# Patient Record
Sex: FEMALE | Employment: UNEMPLOYED | URBAN - METROPOLITAN AREA
[De-identification: names, ages, dates, MRNs, and addresses within clinical notes are randomized per-mention and may not be internally consistent; named-entity substitution may affect disease eponyms.]

---

## 2023-11-08 ENCOUNTER — HOSPITAL ENCOUNTER (EMERGENCY)
Facility: HOSPITAL | Age: 23
Discharge: HOME | End: 2023-11-08
Attending: EMERGENCY MEDICINE
Payer: MEDICAID

## 2023-11-08 ENCOUNTER — APPOINTMENT (OUTPATIENT)
Dept: RADIOLOGY | Facility: HOSPITAL | Age: 23
End: 2023-11-08
Payer: MEDICAID

## 2023-11-08 VITALS
TEMPERATURE: 97.9 F | SYSTOLIC BLOOD PRESSURE: 136 MMHG | WEIGHT: 243.61 LBS | RESPIRATION RATE: 18 BRPM | HEART RATE: 88 BPM | DIASTOLIC BLOOD PRESSURE: 80 MMHG | OXYGEN SATURATION: 98 % | HEIGHT: 62 IN | BODY MASS INDEX: 44.83 KG/M2

## 2023-11-08 DIAGNOSIS — T14.90XA BLUNT TRAUMA: Primary | ICD-10-CM

## 2023-11-08 DIAGNOSIS — Z34.90 PREGNANCY, UNSPECIFIED GESTATIONAL AGE (HHS-HCC): ICD-10-CM

## 2023-11-08 LAB
ALBUMIN SERPL BCP-MCNC: 4.1 G/DL (ref 3.4–5)
ALP SERPL-CCNC: 77 U/L (ref 33–110)
ALT SERPL W P-5'-P-CCNC: 11 U/L (ref 7–45)
ANION GAP SERPL CALC-SCNC: 13 MMOL/L (ref 10–20)
APPEARANCE UR: ABNORMAL
AST SERPL W P-5'-P-CCNC: 13 U/L (ref 9–39)
B-HCG SERPL-ACNC: NORMAL MIU/ML
BACTERIA #/AREA URNS AUTO: ABNORMAL /HPF
BASOPHILS # BLD AUTO: 0.01 X10*3/UL (ref 0–0.1)
BASOPHILS NFR BLD AUTO: 0.1 %
BILIRUB SERPL-MCNC: 0.2 MG/DL (ref 0–1.2)
BILIRUB UR STRIP.AUTO-MCNC: NEGATIVE MG/DL
BUN SERPL-MCNC: 8 MG/DL (ref 6–23)
CALCIUM SERPL-MCNC: 9.8 MG/DL (ref 8.6–10.3)
CHLORIDE SERPL-SCNC: 103 MMOL/L (ref 98–107)
CO2 SERPL-SCNC: 23 MMOL/L (ref 21–32)
COLOR UR: YELLOW
CREAT SERPL-MCNC: 0.59 MG/DL (ref 0.5–1.05)
EOSINOPHIL # BLD AUTO: 0.04 X10*3/UL (ref 0–0.7)
EOSINOPHIL NFR BLD AUTO: 0.4 %
ERYTHROCYTE [DISTWIDTH] IN BLOOD BY AUTOMATED COUNT: 14.4 % (ref 11.5–14.5)
GFR SERPL CREATININE-BSD FRML MDRD: >90 ML/MIN/1.73M*2
GLUCOSE SERPL-MCNC: 79 MG/DL (ref 74–99)
GLUCOSE UR STRIP.AUTO-MCNC: NEGATIVE MG/DL
HCT VFR BLD AUTO: 37.2 % (ref 36–46)
HGB BLD-MCNC: 11.9 G/DL (ref 12–16)
HOLD SPECIMEN: NORMAL
IMM GRANULOCYTES # BLD AUTO: 0.02 X10*3/UL (ref 0–0.7)
IMM GRANULOCYTES NFR BLD AUTO: 0.2 % (ref 0–0.9)
KETONES UR STRIP.AUTO-MCNC: ABNORMAL MG/DL
LEUKOCYTE ESTERASE UR QL STRIP.AUTO: ABNORMAL
LYMPHOCYTES # BLD AUTO: 1.58 X10*3/UL (ref 1.2–4.8)
LYMPHOCYTES NFR BLD AUTO: 17.1 %
MCH RBC QN AUTO: 25.2 PG (ref 26–34)
MCHC RBC AUTO-ENTMCNC: 32 G/DL (ref 32–36)
MCV RBC AUTO: 79 FL (ref 80–100)
MONOCYTES # BLD AUTO: 0.64 X10*3/UL (ref 0.1–1)
MONOCYTES NFR BLD AUTO: 6.9 %
MUCOUS THREADS #/AREA URNS AUTO: ABNORMAL /LPF
NEUTROPHILS # BLD AUTO: 6.97 X10*3/UL (ref 1.2–7.7)
NEUTROPHILS NFR BLD AUTO: 75.3 %
NITRITE UR QL STRIP.AUTO: NEGATIVE
NRBC BLD-RTO: 0 /100 WBCS (ref 0–0)
PH UR STRIP.AUTO: 5 [PH]
PLATELET # BLD AUTO: 294 X10*3/UL (ref 150–450)
POTASSIUM SERPL-SCNC: 3.7 MMOL/L (ref 3.5–5.3)
PROT SERPL-MCNC: 7.9 G/DL (ref 6.4–8.2)
PROT UR STRIP.AUTO-MCNC: ABNORMAL MG/DL
RBC # BLD AUTO: 4.72 X10*6/UL (ref 4–5.2)
RBC # UR STRIP.AUTO: NEGATIVE /UL
RBC #/AREA URNS AUTO: ABNORMAL /HPF
SODIUM SERPL-SCNC: 135 MMOL/L (ref 136–145)
SP GR UR STRIP.AUTO: 1.03
SQUAMOUS #/AREA URNS AUTO: ABNORMAL /HPF
UROBILINOGEN UR STRIP.AUTO-MCNC: 4 MG/DL
WBC # BLD AUTO: 9.3 X10*3/UL (ref 4.4–11.3)
WBC #/AREA URNS AUTO: ABNORMAL /HPF

## 2023-11-08 PROCEDURE — 85025 COMPLETE CBC W/AUTO DIFF WBC: CPT | Performed by: EMERGENCY MEDICINE

## 2023-11-08 PROCEDURE — 2500000004 HC RX 250 GENERAL PHARMACY W/ HCPCS (ALT 636 FOR OP/ED): Performed by: EMERGENCY MEDICINE

## 2023-11-08 PROCEDURE — 99284 EMERGENCY DEPT VISIT MOD MDM: CPT | Mod: 25

## 2023-11-08 PROCEDURE — 76815 OB US LIMITED FETUS(S): CPT | Performed by: RADIOLOGY

## 2023-11-08 PROCEDURE — 84702 CHORIONIC GONADOTROPIN TEST: CPT | Performed by: EMERGENCY MEDICINE

## 2023-11-08 PROCEDURE — 76817 TRANSVAGINAL US OBSTETRIC: CPT

## 2023-11-08 PROCEDURE — 87086 URINE CULTURE/COLONY COUNT: CPT | Mod: ELYLAB | Performed by: EMERGENCY MEDICINE

## 2023-11-08 PROCEDURE — 96360 HYDRATION IV INFUSION INIT: CPT

## 2023-11-08 PROCEDURE — 80053 COMPREHEN METABOLIC PANEL: CPT | Performed by: EMERGENCY MEDICINE

## 2023-11-08 PROCEDURE — 99285 EMERGENCY DEPT VISIT HI MDM: CPT | Mod: 25 | Performed by: EMERGENCY MEDICINE

## 2023-11-08 PROCEDURE — 81001 URINALYSIS AUTO W/SCOPE: CPT | Performed by: EMERGENCY MEDICINE

## 2023-11-08 PROCEDURE — 36415 COLL VENOUS BLD VENIPUNCTURE: CPT | Performed by: EMERGENCY MEDICINE

## 2023-11-08 RX ORDER — ACETAMINOPHEN 325 MG/1
975 TABLET ORAL ONCE
Status: COMPLETED | OUTPATIENT
Start: 2023-11-08 | End: 2023-11-08

## 2023-11-08 RX ADMIN — ACETAMINOPHEN 975 MG: 325 TABLET ORAL at 18:41

## 2023-11-08 RX ADMIN — SODIUM CHLORIDE 1000 ML: 9 INJECTION, SOLUTION INTRAVENOUS at 16:13

## 2023-11-08 ASSESSMENT — PAIN DESCRIPTION - LOCATION: LOCATION: ABDOMEN

## 2023-11-08 ASSESSMENT — PAIN SCALES - GENERAL: PAINLEVEL_OUTOF10: 8

## 2023-11-08 ASSESSMENT — PAIN DESCRIPTION - FREQUENCY: FREQUENCY: CONSTANT/CONTINUOUS

## 2023-11-08 ASSESSMENT — PAIN - FUNCTIONAL ASSESSMENT: PAIN_FUNCTIONAL_ASSESSMENT: 0-10

## 2023-11-08 ASSESSMENT — COLUMBIA-SUICIDE SEVERITY RATING SCALE - C-SSRS
1. IN THE PAST MONTH, HAVE YOU WISHED YOU WERE DEAD OR WISHED YOU COULD GO TO SLEEP AND NOT WAKE UP?: NO
2. HAVE YOU ACTUALLY HAD ANY THOUGHTS OF KILLING YOURSELF?: NO
6. HAVE YOU EVER DONE ANYTHING, STARTED TO DO ANYTHING, OR PREPARED TO DO ANYTHING TO END YOUR LIFE?: NO

## 2023-11-08 ASSESSMENT — PAIN DESCRIPTION - PROGRESSION: CLINICAL_PROGRESSION: NOT CHANGED

## 2023-11-08 ASSESSMENT — PAIN DESCRIPTION - ONSET: ONSET: AWAKENED FROM SLEEP

## 2023-11-08 ASSESSMENT — PAIN DESCRIPTION - DESCRIPTORS: DESCRIPTORS: CRAMPING

## 2023-11-08 ASSESSMENT — PAIN DESCRIPTION - ORIENTATION: ORIENTATION: LOWER

## 2023-11-08 NOTE — ED PROVIDER NOTES
This is a female patient of signed out to me with disposition pending ultrasound.  IMPRESSION:  1.  Single live intrauterine pregnancy at 10 weeks 1 day by  ultrasound criteria. No features of complication.    Given Tylenol for pain relief.  Has no complaints of any vaginal bleeding or leakage of fluid.  Hemodynamically stable.  Plan for discharge and follow-up with OB as needed.     Ted Hoover MD  11/08/23 5767

## 2023-11-08 NOTE — ED PROVIDER NOTES
HPI   Chief Complaint   Patient presents with    Abdominal Pain     Grandmother fell on her and now she is having abdominal pain.  Pt. Is 9 weeks pregnant.       HPI: 23-year-old G2, P1 at approximately 9 weeks states that she was helping her grandmother who fell and landed on her.  She states that she is now having some lower abdominal pain.  She states that she just moved here from [place].  She states that she has had an ultrasound which showed a live IUP before she came and moved here.  She is not complaining of any vaginal bleeding.  She stated no chest pain or shortness of breath.  No upper abdominal pain.  No numbness tingling or weakness.  No blood thinners.    Family HX: Denies any significant/pertinent family history.  Social Hx: Denies ETOH or drug use.  Review of systems:  Gen.: No weight loss, fatigue, anorexia, insomnia, fever.   Eyes: No vision loss, double vision, drainage, eye pain.   ENT: No pharyngitis, dry mouth.   Cardiac: No chest pain, palpitations, syncope, near syncope.   Pulmonary: No shortness of breath, cough, hemoptysis.   Heme/lymph: No swollen glands, fever, bleeding.   GI: No abdominal pain, change in bowel habits, melena, hematemesis, hematochezia, nausea, vomiting, diarrhea.   : No discharge, dysuria, frequency, urgency, hematuria.   Musculoskeletal: No limb pain, joint pain, joint swelling.   Skin: No rashes.   Psych: No depression, anxiety, suicidality, homicidality.   Review of systems is otherwise negative unless stated above or in history of present illness.    Physical Exam:    Appearance: Alert, oriented , cooperative,  in no acute distress. Well nourished & well hydrated.    Skin: Intact,  dry skin, no lesions, rash, petechiae or purpura.  No ecchymosis noted to the chest abdomen    Eyes: PERRLA, EOMs intact,  Conjunctiva pink with no redness or exudates. Eyelids without lesions. No scleral icterus.     ENT: Hearing grossly intact. External auditory canals patent,  "tympanic membranes intact with visible landmarks. Nares patent, mucus membranes moist. Dentition without lesions. Pharynx clear, uvula midline.     Neck: Supple, without meningismus. Thyroid not palpable. Trachea at midline. No lymphadenopathy.    Pulmonary: Clear bilaterally with good chest wall excursion. No rales, rhonchi or wheezing. No accessory muscle use or stridor.    Cardiac: Normal S1, S2 without murmur, rub, gallop or extrasystole. No JVD, Carotids without bruits.    Abdomen: Soft, nontender, active bowel sounds.  No palpable organomegaly.  No rebound or guarding.  No CVA tenderness.    Genitourinary: Exam deferred.    Musculoskeletal: Full range of motion. no pain, edema, or deformity. Pulses full and equal. No cyanosis, clubbing, or edema.    Neurological:  Cranial nerves II through XII are grossly intact, finger-nose touch is normal, normal sensation, no weakness, no focal findings identified.    Psychiatric: Appropriate mood and affect.     Medical Decision-Making:  Testing: EKG was obtained which is interpreted by me shows a sinus rhythm rate of 96 without evidence of obvious ST elevations or T wave inversions to indicate acute ischemia or infarct as interpreted by me.  The EKG was obtained as the patient told the nurse that she was having some chest pain.  On my evaluation she was not having any chest pain.  Her lungs are clear.  Patient will have an ultrasound as she was concerned and wanted to make sure that \"everything was okay\".  At this time patient will be endorsed over to the oncoming emergency physician for reevaluation and disposition.  We discussed and considered other radiology imaging including CT scans however at this time patient states that she will hold off given her early pregnancy  Treatment:   Reevaluation:   Plan: Homegoing. Discussed differential. Will follow-up with the primary physician in the next 2-3 days. Return if worse. They understand return precautions and discharge " instructions. Patient and family/friend/caregiver are in agreement with this plan.   Impression:   1. Blunt trauma  2.pregnancy    Labs Reviewed  CBC WITH AUTO DIFFERENTIAL - Abnormal     WBC                           9.3                    nRBC                          0.0                    RBC                           4.72                   Hemoglobin                    11.9 (*)               Hematocrit                    37.2                   MCV                           79 (*)                 MCH                           25.2 (*)               MCHC                          32.0                   RDW                           14.4                   Platelets                     294                    Neutrophils %                 75.3                   Immature Granulocytes %, Automated   0.2                    Lymphocytes %                 17.1                   Monocytes %                   6.9                    Eosinophils %                 0.4                    Basophils %                   0.1                    Neutrophils Absolute          6.97                   Immature Granulocytes Absolute, Au*   0.02                   Lymphocytes Absolute          1.58                   Monocytes Absolute            0.64                   Eosinophils Absolute          0.04                   Basophils Absolute            0.01                COMPREHENSIVE METABOLIC PANEL - Abnormal     Glucose                       79                     Sodium                        135 (*)                Potassium                     3.7                    Chloride                      103                    Bicarbonate                   23                     Anion Gap                     13                     Urea Nitrogen                 8                      Creatinine                    0.59                   eGFR                          >90                    Calcium                       9.8                    Albumin                        4.1                    Alkaline Phosphatase          77                     Total Protein                 7.9                    AST                           13                     Bilirubin, Total              0.2                    ALT                           11                  URINALYSIS WITH REFLEX MICROSCOPIC AND CULTURE       Narrative: The following orders were created for panel order Urinalysis with Reflex Microscopic and Culture.                Procedure                               Abnormality         Status                                   ---------                               -----------         ------                                   Urinalysis with Reflex M...[[id number]]                      In process                               Extra Urine Gray Tube[[id number]]                            In process                                               Please view results for these tests on the individual orders.  HUMAN CHORIONIC GONADOTROPIN, SERUM QUANTITATIVE  URINALYSIS WITH REFLEX MICROSCOPIC AND CULTURE  EXTRA URINE GRAY TUBE  MICROSCOPIC ONLY, URINE     US pelvis    (Results Pending)         History provided by:  Patient   used: No                        No data recorded                Patient History   History reviewed. No pertinent past medical history.  History reviewed. No pertinent surgical history.  No family history on file.  Social History     Tobacco Use    Smoking status: Never    Smokeless tobacco: Never   Vaping Use    Vaping Use: Never used   Substance Use Topics    Alcohol use: Not Currently    Drug use: Never       Physical Exam   ED Triage Vitals [11/08/23 1447]   Temp Heart Rate Resp BP   36.8 °C (98.2 °F) 102 20 140/83      SpO2 Temp Source Heart Rate Source Patient Position   96 % Temporal Monitor --      BP Location FiO2 (%)     -- --       Physical Exam    ED Course & MDM   Diagnoses as of 11/08/23 1708   Blunt trauma   Pregnancy,  unspecified gestational age       Medical Decision Making      Procedure  Procedures     Vilma Charles MD  11/08/23 8661

## 2023-11-09 ENCOUNTER — HOSPITAL ENCOUNTER (OUTPATIENT)
Dept: CARDIOLOGY | Facility: HOSPITAL | Age: 23
Discharge: HOME | End: 2023-11-09
Payer: MEDICAID

## 2023-11-09 LAB — BACTERIA UR CULT: NORMAL

## 2023-11-09 PROCEDURE — 93005 ELECTROCARDIOGRAM TRACING: CPT

## 2023-12-30 ENCOUNTER — HOSPITAL ENCOUNTER (EMERGENCY)
Age: 23
Discharge: HOME | End: 2023-12-30
Payer: COMMERCIAL

## 2023-12-30 VITALS
HEART RATE: 92 BPM | HEIGHT: 62 IN | WEIGHT: 240 LBS | TEMPERATURE: 98.5 F | BODY MASS INDEX: 44.16 KG/M2 | DIASTOLIC BLOOD PRESSURE: 78 MMHG | OXYGEN SATURATION: 99 % | RESPIRATION RATE: 16 BRPM | SYSTOLIC BLOOD PRESSURE: 133 MMHG

## 2023-12-30 DIAGNOSIS — R11.0 NAUSEA: ICD-10-CM

## 2023-12-30 DIAGNOSIS — R51.9 SINUS HEADACHE: ICD-10-CM

## 2023-12-30 DIAGNOSIS — U07.1 COVID-19: Primary | ICD-10-CM

## 2023-12-30 LAB
ALBUMIN SERPL-MCNC: 3.7 G/DL (ref 3.5–4.6)
ALP SERPL-CCNC: 88 U/L (ref 40–130)
ALT SERPL-CCNC: 9 U/L (ref 0–33)
ANION GAP SERPL CALCULATED.3IONS-SCNC: 12 MEQ/L (ref 9–15)
AST SERPL-CCNC: 20 U/L (ref 0–35)
BASOPHILS # BLD: 0 K/UL (ref 0–0.2)
BASOPHILS NFR BLD: 0.1 %
BILIRUB SERPL-MCNC: <0.2 MG/DL (ref 0.2–0.7)
BILIRUB UR QL STRIP: NEGATIVE
BUN SERPL-MCNC: 5 MG/DL (ref 6–20)
CALCIUM SERPL-MCNC: 9.1 MG/DL (ref 8.5–9.9)
CHLORIDE SERPL-SCNC: 103 MEQ/L (ref 95–107)
CLARITY UR: ABNORMAL
CO2 SERPL-SCNC: 21 MEQ/L (ref 20–31)
COLOR UR: YELLOW
CREAT SERPL-MCNC: 0.42 MG/DL (ref 0.5–0.9)
EOSINOPHIL # BLD: 0.1 K/UL (ref 0–0.7)
EOSINOPHIL NFR BLD: 1.1 %
ERYTHROCYTE [DISTWIDTH] IN BLOOD BY AUTOMATED COUNT: 15.5 % (ref 11.5–14.5)
GLOBULIN SER CALC-MCNC: 3.5 G/DL (ref 2.3–3.5)
GLUCOSE SERPL-MCNC: 75 MG/DL (ref 70–99)
GLUCOSE UR STRIP-MCNC: NEGATIVE MG/DL
HCT VFR BLD AUTO: 36 % (ref 37–47)
HGB BLD-MCNC: 11.6 G/DL (ref 12–16)
HGB UR QL STRIP: NEGATIVE
INFLUENZA A BY PCR: NEGATIVE
INFLUENZA B BY PCR: NEGATIVE
KETONES UR STRIP-MCNC: 15 MG/DL
LEUKOCYTE ESTERASE UR QL STRIP: NEGATIVE
LYMPHOCYTES # BLD: 2 K/UL (ref 1–4.8)
LYMPHOCYTES NFR BLD: 24 %
MCH RBC QN AUTO: 26.1 PG (ref 27–31.3)
MCHC RBC AUTO-ENTMCNC: 32.2 % (ref 33–37)
MCV RBC AUTO: 80.9 FL (ref 79.4–94.8)
MONOCYTES # BLD: 0.5 K/UL (ref 0.2–0.8)
MONOCYTES NFR BLD: 6.5 %
NEUTROPHILS # BLD: 5.7 K/UL (ref 1.4–6.5)
NEUTS SEG NFR BLD: 68.1 %
NITRITE UR QL STRIP: NEGATIVE
PH UR STRIP: 7 [PH] (ref 5–9)
PLATELET # BLD AUTO: 246 K/UL (ref 130–400)
POTASSIUM SERPL-SCNC: 4.1 MEQ/L (ref 3.4–4.9)
PROT SERPL-MCNC: 7.2 G/DL (ref 6.3–8)
PROT UR STRIP-MCNC: NEGATIVE MG/DL
RBC # BLD AUTO: 4.45 M/UL (ref 4.2–5.4)
SARS-COV-2 RDRP RESP QL NAA+PROBE: DETECTED
SODIUM SERPL-SCNC: 136 MEQ/L (ref 135–144)
SP GR UR STRIP: 1.02 (ref 1–1.03)
URINE REFLEX TO CULTURE: ABNORMAL
UROBILINOGEN UR STRIP-ACNC: 1 E.U./DL
WBC # BLD AUTO: 8.4 K/UL (ref 4.8–10.8)

## 2023-12-30 PROCEDURE — 99284 EMERGENCY DEPT VISIT MOD MDM: CPT

## 2023-12-30 PROCEDURE — 87502 INFLUENZA DNA AMP PROBE: CPT

## 2023-12-30 PROCEDURE — 85025 COMPLETE CBC W/AUTO DIFF WBC: CPT

## 2023-12-30 PROCEDURE — 6360000002 HC RX W HCPCS: Performed by: PHYSICIAN ASSISTANT

## 2023-12-30 PROCEDURE — 96374 THER/PROPH/DIAG INJ IV PUSH: CPT

## 2023-12-30 PROCEDURE — 6370000000 HC RX 637 (ALT 250 FOR IP): Performed by: PHYSICIAN ASSISTANT

## 2023-12-30 PROCEDURE — 36415 COLL VENOUS BLD VENIPUNCTURE: CPT

## 2023-12-30 PROCEDURE — 87635 SARS-COV-2 COVID-19 AMP PRB: CPT

## 2023-12-30 PROCEDURE — 80053 COMPREHEN METABOLIC PANEL: CPT

## 2023-12-30 PROCEDURE — 81003 URINALYSIS AUTO W/O SCOPE: CPT

## 2023-12-30 RX ORDER — ACETAMINOPHEN 500 MG
1000 TABLET ORAL ONCE
Status: COMPLETED | OUTPATIENT
Start: 2023-12-30 | End: 2023-12-30

## 2023-12-30 RX ORDER — ONDANSETRON 2 MG/ML
4 INJECTION INTRAMUSCULAR; INTRAVENOUS ONCE
Status: COMPLETED | OUTPATIENT
Start: 2023-12-30 | End: 2023-12-30

## 2023-12-30 RX ORDER — ONDANSETRON 4 MG/1
4 TABLET, FILM COATED ORAL EVERY 8 HOURS PRN
Qty: 20 TABLET | Refills: 0 | Status: SHIPPED | OUTPATIENT
Start: 2023-12-30

## 2023-12-30 RX ADMIN — ONDANSETRON 4 MG: 2 INJECTION INTRAMUSCULAR; INTRAVENOUS at 21:30

## 2023-12-30 RX ADMIN — ACETAMINOPHEN 1000 MG: 500 TABLET ORAL at 23:02

## 2023-12-30 ASSESSMENT — PAIN DESCRIPTION - DESCRIPTORS
DESCRIPTORS: ACHING
DESCRIPTORS: DISCOMFORT

## 2023-12-30 ASSESSMENT — LIFESTYLE VARIABLES
HOW MANY STANDARD DRINKS CONTAINING ALCOHOL DO YOU HAVE ON A TYPICAL DAY: PATIENT DOES NOT DRINK
HOW OFTEN DO YOU HAVE A DRINK CONTAINING ALCOHOL: NEVER

## 2023-12-30 ASSESSMENT — PAIN DESCRIPTION - LOCATION
LOCATION: HEAD
LOCATION: CHEST;ABDOMEN

## 2023-12-30 ASSESSMENT — PAIN SCALES - GENERAL
PAINLEVEL_OUTOF10: 8
PAINLEVEL_OUTOF10: 8

## 2023-12-30 ASSESSMENT — PAIN DESCRIPTION - ONSET: ONSET: ON-GOING

## 2023-12-30 ASSESSMENT — PAIN DESCRIPTION - FREQUENCY: FREQUENCY: CONTINUOUS

## 2023-12-31 NOTE — ED PROVIDER NOTES
[facility] ED  EMERGENCY DEPARTMENT ENCOUNTER      Pt Name: Chuyita Reed  MRN: [id number]  Birthdate 2000  Date of evaluation: 12/30/2023  Provider: Aleks Pozo PA-C  11:13 PM EST    CHIEF COMPLAINT       Chief Complaint   Patient presents with    Abdominal Pain     17 weeks pregnant, chest pain, headache, cough, congestion, vomiting, swelling in both feet x's 1 day         HISTORY OF PRESENT ILLNESS   (Location/Symptom, Timing/Onset, Context/Setting, Quality, Duration, Modifying Factors, Severity)  Note limiting factors.   Chuyita Reed is a 23 y.o. female who presents to the emergency department patient has feeling hot cold cough congestion headache nauseousness 17 weeks pregnant denies any vaginal bleeding rectal bleeding urinary bleeding.  Symptoms x 1 day.  She does take Zofran for nausea and vomiting.  Symptoms mild to moderate severity nothing improves or worsen symptoms.    HPI    Nursing Notes were reviewed.    REVIEW OF SYSTEMS    (2-9 systems for level 4, 10 or more for level 5)     Review of Systems    Except as noted above the remainder of the review of systems was reviewed and negative.       PAST MEDICAL HISTORY   No past medical history on file.      SURGICAL HISTORY     No past surgical history on file.      CURRENT MEDICATIONS       Discharge Medication List as of 12/30/2023 11:13 PM          ALLERGIES     Patient has no allergy information on record.    FAMILY HISTORY     No family history on file.       SOCIAL HISTORY       Social History     Socioeconomic History    Marital status: Single       SCREENINGS         Leticia Coma Scale  Eye Opening: Spontaneous  Best Verbal Response: Oriented  Best Motor Response: Obeys commands  Leticia Coma Scale Score: 15                     CIWA Assessment  BP: 133/78  Pulse: 92                 PHYSICAL EXAM    (up to 7 for level 4, 8 or more for level 5)     ED Triage Vitals [12/30/23 2043]   BP Temp Temp Source Pulse

## 2023-12-31 NOTE — ED NOTES
Pt c/o abdominal pain, chest pain, headache, headache, cough, congestion, vomiting, and swelling both feet. Pt 17 week pregnant. Pt states hasn't felt the baby move in 2 days.

## 2024-01-12 ENCOUNTER — HOSPITAL ENCOUNTER (OUTPATIENT)
Dept: ULTRASOUND IMAGING | Age: 24
End: 2024-01-12
Payer: COMMERCIAL

## 2024-01-12 DIAGNOSIS — Z34.82 PRENATAL CARE, SUBSEQUENT PREGNANCY, SECOND TRIMESTER: ICD-10-CM

## 2024-01-12 PROCEDURE — 76805 OB US >/= 14 WKS SNGL FETUS: CPT

## 2024-03-11 ENCOUNTER — HOSPITAL ENCOUNTER (OUTPATIENT)
Age: 24
Discharge: HOME | End: 2024-03-11
Attending: OBSTETRICS & GYNECOLOGY | Admitting: OBSTETRICS & GYNECOLOGY
Payer: COMMERCIAL

## 2024-03-11 ENCOUNTER — HOSPITAL ENCOUNTER (EMERGENCY)
Age: 24
Discharge: HOME | End: 2024-03-11
Attending: EMERGENCY MEDICINE
Payer: COMMERCIAL

## 2024-03-11 VITALS
OXYGEN SATURATION: 99 % | DIASTOLIC BLOOD PRESSURE: 80 MMHG | WEIGHT: 240 LBS | BODY MASS INDEX: 44.16 KG/M2 | HEIGHT: 62 IN | RESPIRATION RATE: 16 BRPM | HEART RATE: 84 BPM | SYSTOLIC BLOOD PRESSURE: 126 MMHG | TEMPERATURE: 97.6 F

## 2024-03-11 VITALS
RESPIRATION RATE: 18 BRPM | DIASTOLIC BLOOD PRESSURE: 71 MMHG | TEMPERATURE: 98 F | HEART RATE: 78 BPM | SYSTOLIC BLOOD PRESSURE: 148 MMHG | OXYGEN SATURATION: 100 %

## 2024-03-11 DIAGNOSIS — R10.9 ABDOMINAL PAIN, UNSPECIFIED ABDOMINAL LOCATION: Primary | ICD-10-CM

## 2024-03-11 PROBLEM — R10.2 PELVIC PRESSURE IN PREGNANCY, ANTEPARTUM, SECOND TRIMESTER: Status: ACTIVE | Noted: 2024-03-11

## 2024-03-11 PROBLEM — R07.9 NONSPECIFIC CHEST PAIN: Status: ACTIVE | Noted: 2024-03-11

## 2024-03-11 PROBLEM — Z3A.27 27 WEEKS GESTATION OF PREGNANCY: Status: ACTIVE | Noted: 2024-03-11

## 2024-03-11 PROBLEM — O26.892 PELVIC PRESSURE IN PREGNANCY, ANTEPARTUM, SECOND TRIMESTER: Status: ACTIVE | Noted: 2024-03-11

## 2024-03-11 LAB
AMPHET UR QL SCN: NORMAL
BARBITURATES UR QL SCN: NORMAL
BENZODIAZ UR QL SCN: NORMAL
BILIRUB UR QL STRIP: NEGATIVE
CANNABINOIDS UR QL SCN: NORMAL
CLARITY UR: CLEAR
COCAINE UR QL SCN: NORMAL
COLOR UR: YELLOW
DRUG SCREEN COMMENT UR-IMP: NORMAL
FENTANYL SCREEN, URINE: NORMAL
GLUCOSE UR STRIP-MCNC: NEGATIVE MG/DL
HGB UR QL STRIP: NEGATIVE
KETONES UR STRIP-MCNC: NEGATIVE MG/DL
LEUKOCYTE ESTERASE UR QL STRIP: NEGATIVE
METHADONE UR QL SCN: NORMAL
NITRITE UR QL STRIP: NEGATIVE
OPIATES UR QL SCN: NORMAL
OXYCODONE UR QL SCN: NORMAL
PCP UR QL SCN: NORMAL
PH UR STRIP: 7 [PH] (ref 5–9)
PROPOXYPH UR QL SCN: NORMAL
PROT UR STRIP-MCNC: NEGATIVE MG/DL
SP GR UR STRIP: 1.01 (ref 1–1.03)
URINE REFLEX TO CULTURE: NORMAL
UROBILINOGEN UR STRIP-ACNC: 0.2 E.U./DL

## 2024-03-11 PROCEDURE — 93005 ELECTROCARDIOGRAM TRACING: CPT | Performed by: EMERGENCY MEDICINE

## 2024-03-11 PROCEDURE — 81003 URINALYSIS AUTO W/O SCOPE: CPT

## 2024-03-11 PROCEDURE — 80307 DRUG TEST PRSMV CHEM ANLYZR: CPT

## 2024-03-11 PROCEDURE — 99283 EMERGENCY DEPT VISIT LOW MDM: CPT

## 2024-03-11 PROCEDURE — 99213 OFFICE O/P EST LOW 20 MIN: CPT | Performed by: OBSTETRICS & GYNECOLOGY

## 2024-03-11 ASSESSMENT — PAIN - FUNCTIONAL ASSESSMENT
PAIN_FUNCTIONAL_ASSESSMENT: 0-10
PAIN_FUNCTIONAL_ASSESSMENT: 0-10

## 2024-03-11 ASSESSMENT — ENCOUNTER SYMPTOMS
NAUSEA: 0
SORE THROAT: 0
EYE PAIN: 0
SHORTNESS OF BREATH: 0
CHEST TIGHTNESS: 0
VOMITING: 0
ABDOMINAL PAIN: 1

## 2024-03-11 ASSESSMENT — PAIN DESCRIPTION - LOCATION: LOCATION: ABDOMEN

## 2024-03-11 ASSESSMENT — PAIN DESCRIPTION - PAIN TYPE: TYPE: ACUTE PAIN

## 2024-03-11 ASSESSMENT — PAIN DESCRIPTION - ONSET: ONSET: ON-GOING

## 2024-03-11 ASSESSMENT — PAIN DESCRIPTION - DESCRIPTORS: DESCRIPTORS: CRAMPING;PRESSURE

## 2024-03-11 ASSESSMENT — PAIN SCALES - GENERAL: PAINLEVEL_OUTOF10: 8

## 2024-03-11 ASSESSMENT — PAIN DESCRIPTION - ORIENTATION: ORIENTATION: LOWER

## 2024-03-11 ASSESSMENT — PAIN DESCRIPTION - FREQUENCY: FREQUENCY: INTERMITTENT

## 2024-03-11 NOTE — FLOWSHEET NOTE
Patient presents to triage for vaginal pressure after being cleared from the ER for chest pain.  Electronically signed by PIEDAD LEE RN on 3/11/2024 at 6:09 PM

## 2024-03-11 NOTE — FLOWSHEET NOTE
Discharge instructions given. Patient educated on use of BP cuff.  Electronically signed by PIEDAD LEE RN on 3/11/2024 at 6:44 PM

## 2024-03-11 NOTE — ED PROVIDER NOTES
Reviewed  ECG/medicine tests: ordered.          REASSESSMENT          CRITICAL CARE TIME   Total Critical Care time was 0 minutes, excluding separately reportable procedures.  There was a high probability of clinically significant/life threatening deterioration in the patient's condition which required my urgent intervention.      CONSULTS:  None    PROCEDURES:  Unless otherwise noted below, none     Procedures        FINAL IMPRESSION      1. Abdominal pain, unspecified abdominal location          DISPOSITION/PLAN   DISPOSITION Decision To Discharge 03/11/2024 05:40:01 PM      PATIENT REFERRED TO:  Dr Szymanski      As needed      DISCHARGE MEDICATIONS:  New Prescriptions    No medications on file     Controlled Substances Monitoring:          No data to display                (Please note that portions of this note were completed with a voice recognition program.  Efforts were made to edit the dictations but occasionally words are mis-transcribed.)    Susu Mueller DO (electronically signed)  Attending Emergency Physician            Susu Mueller DO  03/11/24 5579

## 2024-03-11 NOTE — ED TRIAGE NOTES
Department of Obstetrics and Gynecology  Labor and Delivery  Lonny Ambriz MD: OB Triage Note      SUBJECTIVE:  Patient is a 22 yo  female @ 27.4 weeks with EDC 24 who presents with complaints of lower abdominal pain and pressure. She was initially seen in ER secondary to symptoms of CP. She admits to crying and been upset prior to symptoms of CP. CP has since resolved. She reports +FM, denies LOF, VB, VD, or contractions. She does reports vaginal pressure. She denies any aggravating events, and reports that she was laying down all day when pressure. She denies any recent coitus.     OBJECTIVE    ROS:  Gen: Negative  CV: Negative  Lungs: Negative  Abdomen: +FM, lower abdominal cramping  Pelvis: Pressure  Rest of systems reviewed and found to be negative.      Recent Vitals     Most Recent Value 3/11/2024  1818 3/11/2024  1812 3/11/2024  1811   BP: 148/71 Abnormal    as of 3/11/2024 -- 148/71 Abnormal   116/59 Abnormal        left right   Temp: 98 °F (36.7 °C)  as of 3/11/2024 98 °F (36.7 °C) -- --   Pulse: 78  as of 3/11/2024 -- 78 81   Height: 157.5 cm (5' 2\")  as of 3/11/2024 -- -- --   Weight - Scale: 108.9 kg (240 lb)  as of 3/11/2024 -- -- --   Body Mass Index: 43.90 kg/m² Abnormal   157.5 cm (5' 2\")  as of 3/11/2024  108.9 kg (240 lb)  as of 3/11/2024      Respirations: 18  as of 3/11/2024 -- -- 18   SpO2: 100%  as of 3/11/2024 -- -- 100 %         PE:   Gen: A x O x 3, in NAD  Abdomen: soft, gravid, +FM  Cervix:             Dilation:  Closed         Effacement:  Long         Station: -3         Consistency: Medium         Position: Posterior      Fetal heart rate by Doptones: 150s     Contraction frequency: none    DATA:  Results     Component Value Units   Urine Drug Screen [[id number]]    Collected: 24    Updated: 24    Specimen Source: Urine     Amphetamine Screen, Urine Neg    Barbiturate Screen, Ur Neg    Benzodiazepine Screen, Urine Neg    Cannabinoid Scrn, Ur Neg

## 2024-03-14 LAB
EKG ATRIAL RATE: 83 BPM
EKG P AXIS: 9 DEGREES
EKG P-R INTERVAL: 136 MS
EKG Q-T INTERVAL: 352 MS
EKG QRS DURATION: 76 MS
EKG QTC CALCULATION (BAZETT): 413 MS
EKG R AXIS: 38 DEGREES
EKG T AXIS: 24 DEGREES
EKG VENTRICULAR RATE: 83 BPM

## 2024-03-14 PROCEDURE — 93010 ELECTROCARDIOGRAM REPORT: CPT | Performed by: INTERNAL MEDICINE

## 2024-03-29 ENCOUNTER — HOSPITAL ENCOUNTER (OUTPATIENT)
Dept: ULTRASOUND IMAGING | Age: 24
End: 2024-03-29
Payer: COMMERCIAL

## 2024-03-29 DIAGNOSIS — Z34.83 PRENATAL CARE, SUBSEQUENT PREGNANCY, THIRD TRIMESTER: ICD-10-CM

## 2024-03-29 PROCEDURE — 76805 OB US >/= 14 WKS SNGL FETUS: CPT

## 2024-04-23 ENCOUNTER — HOSPITAL ENCOUNTER (OUTPATIENT)
Age: 24
Discharge: HOME | End: 2024-04-23
Attending: OBSTETRICS & GYNECOLOGY | Admitting: OBSTETRICS & GYNECOLOGY
Payer: COMMERCIAL

## 2024-04-23 VITALS
HEART RATE: 78 BPM | DIASTOLIC BLOOD PRESSURE: 62 MMHG | OXYGEN SATURATION: 99 % | RESPIRATION RATE: 18 BRPM | BODY MASS INDEX: 44.16 KG/M2 | HEIGHT: 62 IN | TEMPERATURE: 98.2 F | SYSTOLIC BLOOD PRESSURE: 116 MMHG | WEIGHT: 240 LBS

## 2024-04-23 PROBLEM — O13.3 GESTATIONAL HYPERTENSION, THIRD TRIMESTER: Status: ACTIVE | Noted: 2024-04-23

## 2024-04-23 PROBLEM — Z03.71 NO LEAKAGE OF AMNIOTIC FLUID INTO VAGINA: Status: ACTIVE | Noted: 2024-04-23

## 2024-04-23 PROBLEM — O34.219 HISTORY OF CESAREAN SECTION COMPLICATING PREGNANCY: Status: ACTIVE | Noted: 2024-04-23

## 2024-04-23 PROBLEM — Z3A.33 33 WEEKS GESTATION OF PREGNANCY: Status: ACTIVE | Noted: 2024-04-23

## 2024-04-23 LAB
ALBUMIN SERPL-MCNC: 3.6 G/DL (ref 3.5–4.6)
ALP SERPL-CCNC: 143 U/L (ref 40–130)
ALT SERPL-CCNC: 10 U/L (ref 0–33)
AMPHET UR QL SCN: NORMAL
ANION GAP SERPL CALCULATED.3IONS-SCNC: 13 MEQ/L (ref 9–15)
APTT PPP: 25.9 SEC (ref 24.4–36.8)
AST SERPL-CCNC: 14 U/L (ref 0–35)
BARBITURATES UR QL SCN: NORMAL
BASOPHILS # BLD: 0 K/UL (ref 0–0.2)
BASOPHILS NFR BLD: 0.1 %
BENZODIAZ UR QL SCN: NORMAL
BILIRUB SERPL-MCNC: <0.2 MG/DL (ref 0.2–0.7)
BILIRUB UR QL STRIP: NEGATIVE
BUN SERPL-MCNC: 3 MG/DL (ref 6–20)
CALCIUM SERPL-MCNC: 9.1 MG/DL (ref 8.5–9.9)
CANNABINOIDS UR QL SCN: NORMAL
CHLORIDE SERPL-SCNC: 106 MEQ/L (ref 95–107)
CLARITY UR: CLEAR
CO2 SERPL-SCNC: 19 MEQ/L (ref 20–31)
COCAINE UR QL SCN: NORMAL
COLOR UR: YELLOW
CREAT SERPL-MCNC: 0.39 MG/DL (ref 0.5–0.9)
CREAT UR-MCNC: 98.8 MG/DL
DRUG SCREEN COMMENT UR-IMP: NORMAL
EOSINOPHIL # BLD: 0.1 K/UL (ref 0–0.7)
EOSINOPHIL NFR BLD: 1 %
ERYTHROCYTE [DISTWIDTH] IN BLOOD BY AUTOMATED COUNT: 14.3 % (ref 11.5–14.5)
FENTANYL SCREEN, URINE: NORMAL
GLOBULIN SER CALC-MCNC: 3.2 G/DL (ref 2.3–3.5)
GLUCOSE SERPL-MCNC: 73 MG/DL (ref 70–99)
GLUCOSE UR STRIP-MCNC: NEGATIVE MG/DL
HCT VFR BLD AUTO: 39.1 % (ref 37–47)
HGB BLD-MCNC: 12.8 G/DL (ref 12–16)
HGB UR QL STRIP: NEGATIVE
INR PPP: 1
KETONES UR STRIP-MCNC: NEGATIVE MG/DL
LDH SERPL-CCNC: 137 U/L (ref 135–214)
LEUKOCYTE ESTERASE UR QL STRIP: NEGATIVE
LYMPHOCYTES # BLD: 1.6 K/UL (ref 1–4.8)
LYMPHOCYTES NFR BLD: 23 %
MCH RBC QN AUTO: 27.6 PG (ref 27–31.3)
MCHC RBC AUTO-ENTMCNC: 32.7 % (ref 33–37)
MCV RBC AUTO: 84.3 FL (ref 79.4–94.8)
METHADONE UR QL SCN: NORMAL
MONOCYTES # BLD: 0.4 K/UL (ref 0.2–0.8)
MONOCYTES NFR BLD: 5.6 %
NEUTROPHILS # BLD: 4.7 K/UL (ref 1.4–6.5)
NEUTS SEG NFR BLD: 70 %
NITRITE UR QL STRIP: NEGATIVE
OPIATES UR QL SCN: NORMAL
OXYCODONE UR QL SCN: NORMAL
PCP UR QL SCN: NORMAL
PH UR STRIP: 7.5 [PH] (ref 5–9)
PLACENTA ALPHA MICROGLOBULIN-1: NEGATIVE
PLATELET # BLD AUTO: 193 K/UL (ref 130–400)
POTASSIUM SERPL-SCNC: 3.6 MEQ/L (ref 3.4–4.9)
PROPOXYPH UR QL SCN: NORMAL
PROT SERPL-MCNC: 6.8 G/DL (ref 6.3–8)
PROT UR STRIP-MCNC: NEGATIVE MG/DL
PROT UR-MCNC: 16 MG/DL
PROT/CREAT UR-RTO: 0.2 ML/ML
PROT/CREAT UR-RTO: 0.2 ML/ML (ref 0–0.2)
PROTHROMBIN TIME: 12.9 SEC (ref 12.3–14.9)
RBC # BLD AUTO: 4.64 M/UL (ref 4.2–5.4)
SODIUM SERPL-SCNC: 138 MEQ/L (ref 135–144)
SP GR UR STRIP: 1.01 (ref 1–1.03)
URATE SERPL-MCNC: 3.5 MG/DL (ref 2.4–5.7)
URINE REFLEX TO CULTURE: NORMAL
UROBILINOGEN UR STRIP-ACNC: 1 E.U./DL
WBC # BLD AUTO: 6.8 K/UL (ref 4.8–10.8)

## 2024-04-23 PROCEDURE — 80053 COMPREHEN METABOLIC PANEL: CPT

## 2024-04-23 PROCEDURE — 80307 DRUG TEST PRSMV CHEM ANLYZR: CPT

## 2024-04-23 PROCEDURE — 84550 ASSAY OF BLOOD/URIC ACID: CPT

## 2024-04-23 PROCEDURE — 81003 URINALYSIS AUTO W/O SCOPE: CPT

## 2024-04-23 PROCEDURE — 59025 FETAL NON-STRESS TEST: CPT | Performed by: OBSTETRICS & GYNECOLOGY

## 2024-04-23 PROCEDURE — 85610 PROTHROMBIN TIME: CPT

## 2024-04-23 PROCEDURE — 84156 ASSAY OF PROTEIN URINE: CPT

## 2024-04-23 PROCEDURE — 83615 LACTATE (LD) (LDH) ENZYME: CPT

## 2024-04-23 PROCEDURE — 85730 THROMBOPLASTIN TIME PARTIAL: CPT

## 2024-04-23 PROCEDURE — 85025 COMPLETE CBC W/AUTO DIFF WBC: CPT

## 2024-04-23 PROCEDURE — 84112 EVAL AMNIOTIC FLUID PROTEIN: CPT

## 2024-04-23 PROCEDURE — 99214 OFFICE O/P EST MOD 30 MIN: CPT | Performed by: OBSTETRICS & GYNECOLOGY

## 2024-04-23 RX ORDER — ACETAMINOPHEN 500 MG
500 TABLET ORAL EVERY 6 HOURS PRN
Status: ON HOLD | COMMUNITY
End: 2024-05-02 | Stop reason: HOSPADM

## 2024-04-23 NOTE — FLOWSHEET NOTE
Verbal and written discharge instructions given to pt. PT instructed on 24 hour urine collection and when to bring it back to the lab. Instructed on BP log sheet and to take to her next appointment. All questions answered and pt verbalized understanding. Pt ambulated off unit in stable conditions.

## 2024-04-23 NOTE — ED TRIAGE NOTES
Department of Obstetrics and Gynecology  Labor and Delivery  Lonny Ambriz MD: OB Triage Note      SUBJECTIVE:  Patient is a 24 yo  female @ 33.5 weeks with EDC 2024 s/b LMP 2023 c/b 13.1 week sono  She presents with with complaints of spotting with wiping, dysuria and concern for LOF. She also reports bilateral back pain that radiates down her legs.. She denies any recent coitus. She reports +FM, denies regular contractions.     OBJECTIVE    ROS:  Gen: Negative  CV: Negative  Lungs: Negative  Abdomen: +FM, back pain  Pelvis: ?LOF  Rest of systems reviewed and found to be negative.    Vitals:  BP (!) 141/74   Pulse 82   Temp 98.2 °F (36.8 °C) (Oral)   Resp 18   Ht 1.575 m (5' 2\")   Wt 108.9 kg (240 lb)   SpO2 99%   BMI 43.90 kg/m²     PE:   Gen: A x O x 3, in NAD  Abdomen: gravid, soft, +FM  Cervix:  Deferred    Membranes: Intact    Fetal heart rate:  Category I (REACTIVE)-duration 1 hour       Baseline Heart Rate: 140s R       Accelerations:  present       Decelerations: none       Variability:  moderate    Contraction frequency: none    DATA:  Time Thierno  [Time Thierno Orders]     Results     Component Value Units   Uric Acid [[id number]]    Collected: 24    Updated: 24    Specimen Type: Blood     Uric Acid, Serum 3.5 mg/dL   Lactate Dehydrogenase [[id number]]    Collected: 24    Updated: 24    Specimen Type: Blood      U/L   Comprehensive Metabolic Panel [[id number]] (Abnormal)    Collected: 24    Updated: 24    Specimen Type: Blood     Sodium 138 mEq/L    Potassium 3.6 mEq/L    Chloride 106 mEq/L    CO2 19 Low  mEq/L    Anion Gap 13 mEq/L    Glucose 73 mg/dL    BUN 3 Low  mg/dL    Creatinine 0.39 Low  mg/dL    Est, Glom Filt Rate >90.0    Comment: Pediatric calculator link  https://www.kidney.org/professionals/kdoqi/gfr_calculatorped  Effective Oct 3, 2022  These results are not intended for use in patients  <18 years

## 2024-04-23 NOTE — FLOWSHEET NOTE
Dr Ambriz on unit, reviewing chart, notified pt reported burning when amniosure swab inserted and blood noted on swab, mild range Bps.

## 2024-04-24 LAB
CREAT 24H UR-MRATE: 1.85 G/TV (ref 0.8–1.5)
CREAT UR-MCNC: 142 MG/DL
PROT 24H UR-MRATE: 338 MG/TV (ref 0–200)
SPECIMEN VOL 24H UR: 1300 ML

## 2024-04-30 ENCOUNTER — HOSPITAL ENCOUNTER (INPATIENT)
Age: 24
LOS: 2 days | Discharge: HOME | DRG: 566 | End: 2024-05-02
Attending: OBSTETRICS & GYNECOLOGY | Admitting: OBSTETRICS & GYNECOLOGY
Payer: COMMERCIAL

## 2024-04-30 DIAGNOSIS — O14.93 PRE-ECLAMPSIA IN THIRD TRIMESTER: Primary | ICD-10-CM

## 2024-04-30 DIAGNOSIS — M79.661 BILATERAL CALF PAIN: ICD-10-CM

## 2024-04-30 DIAGNOSIS — M79.662 BILATERAL CALF PAIN: ICD-10-CM

## 2024-04-30 PROBLEM — Z78.9 ADMITTED TO LABOR AND DELIVERY: Status: ACTIVE | Noted: 2024-04-30

## 2024-04-30 LAB
AMPHET UR QL SCN: NORMAL
BARBITURATES UR QL SCN: NORMAL
BENZODIAZ UR QL SCN: NORMAL
BILIRUB UR QL STRIP: NEGATIVE
CANNABINOIDS UR QL SCN: NORMAL
CLARITY UR: CLEAR
COCAINE UR QL SCN: NORMAL
COLOR UR: YELLOW
DRUG SCREEN COMMENT UR-IMP: NORMAL
FENTANYL SCREEN, URINE: NORMAL
GLUCOSE UR STRIP-MCNC: 100 MG/DL
HGB UR QL STRIP: NEGATIVE
KETONES UR STRIP-MCNC: 15 MG/DL
LEUKOCYTE ESTERASE UR QL STRIP: NEGATIVE
METHADONE UR QL SCN: NORMAL
NITRITE UR QL STRIP: NEGATIVE
OPIATES UR QL SCN: NORMAL
OXYCODONE UR QL SCN: NORMAL
PCP UR QL SCN: NORMAL
PH UR STRIP: 6 [PH] (ref 5–9)
PROPOXYPH UR QL SCN: NORMAL
PROT UR STRIP-MCNC: ABNORMAL MG/DL
SP GR UR STRIP: 1.02 (ref 1–1.03)
URINE REFLEX TO CULTURE: ABNORMAL
UROBILINOGEN UR STRIP-ACNC: 1 E.U./DL

## 2024-04-30 PROCEDURE — 59025 FETAL NON-STRESS TEST: CPT | Performed by: OBSTETRICS & GYNECOLOGY

## 2024-04-30 PROCEDURE — 1220000000 HC SEMI PRIVATE OB R&B

## 2024-04-30 PROCEDURE — 99223 1ST HOSP IP/OBS HIGH 75: CPT | Performed by: OBSTETRICS & GYNECOLOGY

## 2024-04-30 PROCEDURE — 80053 COMPREHEN METABOLIC PANEL: CPT

## 2024-04-30 PROCEDURE — 87389 HIV-1 AG W/HIV-1&-2 AB AG IA: CPT

## 2024-04-30 PROCEDURE — 6370000000 HC RX 637 (ALT 250 FOR IP): Performed by: OBSTETRICS & GYNECOLOGY

## 2024-04-30 PROCEDURE — 81003 URINALYSIS AUTO W/O SCOPE: CPT

## 2024-04-30 PROCEDURE — 2580000003 HC RX 258: Performed by: OBSTETRICS & GYNECOLOGY

## 2024-04-30 PROCEDURE — 84156 ASSAY OF PROTEIN URINE: CPT

## 2024-04-30 PROCEDURE — 6360000002 HC RX W HCPCS

## 2024-04-30 PROCEDURE — 80307 DRUG TEST PRSMV CHEM ANLYZR: CPT

## 2024-04-30 RX ORDER — SODIUM CHLORIDE 0.9 % (FLUSH) 0.9 %
5-40 SYRINGE (ML) INJECTION EVERY 12 HOURS SCHEDULED
Status: DISCONTINUED | OUTPATIENT
Start: 2024-04-30 | End: 2024-05-02 | Stop reason: HOSPADM

## 2024-04-30 RX ORDER — SODIUM CHLORIDE 0.9 % (FLUSH) 0.9 %
5-40 SYRINGE (ML) INJECTION PRN
Status: DISCONTINUED | OUTPATIENT
Start: 2024-04-30 | End: 2024-05-02 | Stop reason: HOSPADM

## 2024-04-30 RX ORDER — SODIUM CHLORIDE, SODIUM LACTATE, POTASSIUM CHLORIDE, AND CALCIUM CHLORIDE .6; .31; .03; .02 G/100ML; G/100ML; G/100ML; G/100ML
500 INJECTION, SOLUTION INTRAVENOUS PRN
Status: DISCONTINUED | OUTPATIENT
Start: 2024-04-30 | End: 2024-05-02 | Stop reason: HOSPADM

## 2024-04-30 RX ORDER — BETAMETHASONE SODIUM PHOSPHATE AND BETAMETHASONE ACETATE 3; 3 MG/ML; MG/ML
12 INJECTION, SUSPENSION INTRA-ARTICULAR; INTRALESIONAL; INTRAMUSCULAR; SOFT TISSUE DAILY
Status: DISCONTINUED | OUTPATIENT
Start: 2024-05-01 | End: 2024-04-30

## 2024-04-30 RX ORDER — ZOLPIDEM TARTRATE 5 MG/1
5 TABLET ORAL NIGHTLY PRN
Status: DISCONTINUED | OUTPATIENT
Start: 2024-04-30 | End: 2024-05-02 | Stop reason: HOSPADM

## 2024-04-30 RX ORDER — SODIUM CHLORIDE, SODIUM LACTATE, POTASSIUM CHLORIDE, CALCIUM CHLORIDE 600; 310; 30; 20 MG/100ML; MG/100ML; MG/100ML; MG/100ML
INJECTION, SOLUTION INTRAVENOUS CONTINUOUS
Status: DISCONTINUED | OUTPATIENT
Start: 2024-04-30 | End: 2024-05-02 | Stop reason: HOSPADM

## 2024-04-30 RX ORDER — SODIUM CHLORIDE 9 MG/ML
INJECTION, SOLUTION INTRAVENOUS PRN
Status: DISCONTINUED | OUTPATIENT
Start: 2024-04-30 | End: 2024-05-02 | Stop reason: HOSPADM

## 2024-04-30 RX ORDER — BETAMETHASONE SODIUM PHOSPHATE AND BETAMETHASONE ACETATE 3; 3 MG/ML; MG/ML
INJECTION, SUSPENSION INTRA-ARTICULAR; INTRALESIONAL; INTRAMUSCULAR; SOFT TISSUE
Status: COMPLETED
Start: 2024-04-30 | End: 2024-04-30

## 2024-04-30 RX ORDER — DOCUSATE SODIUM 100 MG/1
100 CAPSULE, LIQUID FILLED ORAL 2 TIMES DAILY PRN
Status: DISCONTINUED | OUTPATIENT
Start: 2024-04-30 | End: 2024-05-02 | Stop reason: HOSPADM

## 2024-04-30 RX ORDER — CALCIUM CARBONATE 500 MG/1
1000 TABLET, CHEWABLE ORAL 3 TIMES DAILY PRN
Status: DISCONTINUED | OUTPATIENT
Start: 2024-04-30 | End: 2024-05-02 | Stop reason: HOSPADM

## 2024-04-30 RX ORDER — BETAMETHASONE SODIUM PHOSPHATE AND BETAMETHASONE ACETATE 3; 3 MG/ML; MG/ML
12 INJECTION, SUSPENSION INTRA-ARTICULAR; INTRALESIONAL; INTRAMUSCULAR; SOFT TISSUE DAILY
Status: ACTIVE | OUTPATIENT
Start: 2024-04-30 | End: 2024-05-02

## 2024-04-30 RX ORDER — ACETAMINOPHEN 325 MG/1
650 TABLET ORAL EVERY 4 HOURS PRN
Status: DISCONTINUED | OUTPATIENT
Start: 2024-04-30 | End: 2024-05-02 | Stop reason: HOSPADM

## 2024-04-30 RX ORDER — ONDANSETRON 4 MG/1
4 TABLET, ORALLY DISINTEGRATING ORAL EVERY 6 HOURS PRN
OUTPATIENT
Start: 2024-04-30

## 2024-04-30 RX ORDER — NALBUPHINE HYDROCHLORIDE 20 MG/ML
5 INJECTION, SOLUTION INTRAMUSCULAR; INTRAVENOUS; SUBCUTANEOUS
Status: ACTIVE | OUTPATIENT
Start: 2024-04-30 | End: 2024-05-01

## 2024-04-30 RX ORDER — LABETALOL 200 MG/1
200 TABLET, FILM COATED ORAL EVERY 12 HOURS SCHEDULED
Status: DISCONTINUED | OUTPATIENT
Start: 2024-04-30 | End: 2024-05-02 | Stop reason: HOSPADM

## 2024-04-30 RX ORDER — SODIUM CHLORIDE, SODIUM LACTATE, POTASSIUM CHLORIDE, AND CALCIUM CHLORIDE .6; .31; .03; .02 G/100ML; G/100ML; G/100ML; G/100ML
1000 INJECTION, SOLUTION INTRAVENOUS PRN
Status: DISCONTINUED | OUTPATIENT
Start: 2024-04-30 | End: 2024-05-02 | Stop reason: HOSPADM

## 2024-04-30 RX ORDER — ONDANSETRON 2 MG/ML
4 INJECTION INTRAMUSCULAR; INTRAVENOUS EVERY 6 HOURS PRN
OUTPATIENT
Start: 2024-04-30

## 2024-04-30 RX ADMIN — SODIUM CHLORIDE, POTASSIUM CHLORIDE, SODIUM LACTATE AND CALCIUM CHLORIDE: 600; 310; 30; 20 INJECTION, SOLUTION INTRAVENOUS at 23:47

## 2024-04-30 RX ADMIN — ANTACID TABLETS 1000 MG: 500 TABLET, CHEWABLE ORAL at 23:22

## 2024-04-30 RX ADMIN — ACETAMINOPHEN 325MG 650 MG: 325 TABLET ORAL at 23:18

## 2024-04-30 RX ADMIN — BETAMETHASONE SODIUM PHOSPHATE AND BETAMETHASONE ACETATE 12 MG: 3; 3 INJECTION, SUSPENSION INTRA-ARTICULAR; INTRALESIONAL; INTRAMUSCULAR at 23:21

## 2024-04-30 RX ADMIN — BETAMETHASONE SODIUM PHOSPHATE AND BETAMETHASONE ACETATE 12 MG: 3; 3 INJECTION, SUSPENSION INTRA-ARTICULAR; INTRALESIONAL; INTRAMUSCULAR; SOFT TISSUE at 23:21

## 2024-04-30 RX ADMIN — LABETALOL HYDROCHLORIDE 200 MG: 200 TABLET, FILM COATED ORAL at 23:01

## 2024-04-30 ASSESSMENT — PAIN DESCRIPTION - ORIENTATION: ORIENTATION: MID

## 2024-04-30 ASSESSMENT — PAIN SCALES - GENERAL: PAINLEVEL_OUTOF10: 4

## 2024-04-30 ASSESSMENT — PAIN DESCRIPTION - LOCATION: LOCATION: HEAD

## 2024-04-30 ASSESSMENT — PAIN DESCRIPTION - DESCRIPTORS: DESCRIPTORS: ACHING;SORE

## 2024-04-30 ASSESSMENT — PAIN - FUNCTIONAL ASSESSMENT: PAIN_FUNCTIONAL_ASSESSMENT: ACTIVITIES ARE NOT PREVENTED

## 2024-05-01 ENCOUNTER — APPOINTMENT (OUTPATIENT)
Dept: ULTRASOUND IMAGING | Age: 24
DRG: 566 | End: 2024-05-01
Payer: COMMERCIAL

## 2024-05-01 LAB
ABO + RH BLD: NORMAL
ALBUMIN SERPL-MCNC: 3.5 G/DL (ref 3.5–4.6)
ALP SERPL-CCNC: 154 U/L (ref 40–130)
ALT SERPL-CCNC: 10 U/L (ref 0–33)
AMPHET UR QL SCN: NORMAL
ANION GAP SERPL CALCULATED.3IONS-SCNC: 11 MEQ/L (ref 9–15)
AST SERPL-CCNC: 14 U/L (ref 0–35)
BACTERIA URNS QL MICRO: ABNORMAL /HPF
BARBITURATES UR QL SCN: NORMAL
BASOPHILS # BLD: 0 K/UL (ref 0–0.2)
BASOPHILS NFR BLD: 0.2 %
BENZODIAZ UR QL SCN: NORMAL
BILIRUB SERPL-MCNC: <0.2 MG/DL (ref 0.2–0.7)
BILIRUB UR QL STRIP: NEGATIVE
BLD GP AB SCN SERPL QL: NORMAL
BUN SERPL-MCNC: 4 MG/DL (ref 6–20)
CALCIUM SERPL-MCNC: 9.2 MG/DL (ref 8.5–9.9)
CANNABINOIDS UR QL SCN: NORMAL
CHLORIDE SERPL-SCNC: 104 MEQ/L (ref 95–107)
CLARITY UR: CLEAR
CO2 SERPL-SCNC: 22 MEQ/L (ref 20–31)
COCAINE UR QL SCN: NORMAL
COLOR UR: YELLOW
CREAT SERPL-MCNC: 0.52 MG/DL (ref 0.5–0.9)
CREAT UR-MCNC: 142.1 MG/DL
DRUG SCREEN COMMENT UR-IMP: NORMAL
EOSINOPHIL # BLD: 0.1 K/UL (ref 0–0.7)
EOSINOPHIL NFR BLD: 0.8 %
EPI CELLS #/AREA URNS AUTO: ABNORMAL /HPF (ref 0–5)
ERYTHROCYTE [DISTWIDTH] IN BLOOD BY AUTOMATED COUNT: 14.1 % (ref 11.5–14.5)
FENTANYL SCREEN, URINE: NORMAL
GLOBULIN SER CALC-MCNC: 3.4 G/DL (ref 2.3–3.5)
GLUCOSE SERPL-MCNC: 87 MG/DL (ref 70–99)
GLUCOSE UR STRIP-MCNC: 500 MG/DL
HBV SURFACE AG SERPL QL IA: NORMAL
HCT VFR BLD AUTO: 39.4 % (ref 37–47)
HGB BLD-MCNC: 13 G/DL (ref 12–16)
HGB UR QL STRIP: NEGATIVE
HIV AG/AB: NONREACTIVE
HYALINE CASTS #/AREA URNS AUTO: ABNORMAL /HPF (ref 0–5)
KETONES UR STRIP-MCNC: 15 MG/DL
LEUKOCYTE ESTERASE UR QL STRIP: NEGATIVE
LYMPHOCYTES # BLD: 2.7 K/UL (ref 1–4.8)
LYMPHOCYTES NFR BLD: 28.6 %
MCH RBC QN AUTO: 28.1 PG (ref 27–31.3)
MCHC RBC AUTO-ENTMCNC: 33 % (ref 33–37)
MCV RBC AUTO: 85.3 FL (ref 79.4–94.8)
METHADONE UR QL SCN: NORMAL
MONOCYTES # BLD: 0.7 K/UL (ref 0.2–0.8)
MONOCYTES NFR BLD: 7.8 %
NEUTROPHILS # BLD: 5.9 K/UL (ref 1.4–6.5)
NEUTS SEG NFR BLD: 62.3 %
NITRITE UR QL STRIP: POSITIVE
OPIATES UR QL SCN: NORMAL
OXYCODONE UR QL SCN: NORMAL
PCP UR QL SCN: NORMAL
PH UR STRIP: 7 [PH] (ref 5–9)
PLATELET # BLD AUTO: 191 K/UL (ref 130–400)
POTASSIUM SERPL-SCNC: 3.1 MEQ/L (ref 3.4–4.9)
PROPOXYPH UR QL SCN: NORMAL
PROT SERPL-MCNC: 6.9 G/DL (ref 6.3–8)
PROT UR STRIP-MCNC: NEGATIVE MG/DL
PROT UR-MCNC: 22 MG/DL
PROT/CREAT UR-RTO: 0.2 ML/ML
PROT/CREAT UR-RTO: 0.2 ML/ML (ref 0–0.2)
RBC # BLD AUTO: 4.62 M/UL (ref 4.2–5.4)
RBC #/AREA URNS AUTO: ABNORMAL /HPF (ref 0–5)
RPR SER QL: NORMAL
SODIUM SERPL-SCNC: 137 MEQ/L (ref 135–144)
SP GR UR STRIP: 1.01 (ref 1–1.03)
UROBILINOGEN UR STRIP-ACNC: 0.2 E.U./DL
WBC # BLD AUTO: 9.5 K/UL (ref 4.8–10.8)
WBC #/AREA URNS AUTO: ABNORMAL /HPF (ref 0–5)

## 2024-05-01 PROCEDURE — 87491 CHLMYD TRACH DNA AMP PROBE: CPT

## 2024-05-01 PROCEDURE — 87081 CULTURE SCREEN ONLY: CPT

## 2024-05-01 PROCEDURE — 86901 BLOOD TYPING SEROLOGIC RH(D): CPT

## 2024-05-01 PROCEDURE — 86900 BLOOD TYPING SEROLOGIC ABO: CPT

## 2024-05-01 PROCEDURE — 59025 FETAL NON-STRESS TEST: CPT

## 2024-05-01 PROCEDURE — 86592 SYPHILIS TEST NON-TREP QUAL: CPT

## 2024-05-01 PROCEDURE — 87591 N.GONORRHOEAE DNA AMP PROB: CPT

## 2024-05-01 PROCEDURE — 86403 PARTICLE AGGLUT ANTBDY SCRN: CPT

## 2024-05-01 PROCEDURE — 59025 FETAL NON-STRESS TEST: CPT | Performed by: OBSTETRICS & GYNECOLOGY

## 2024-05-01 PROCEDURE — 1220000000 HC SEMI PRIVATE OB R&B

## 2024-05-01 PROCEDURE — 87340 HEPATITIS B SURFACE AG IA: CPT

## 2024-05-01 PROCEDURE — 86850 RBC ANTIBODY SCREEN: CPT

## 2024-05-01 PROCEDURE — 80307 DRUG TEST PRSMV CHEM ANLYZR: CPT

## 2024-05-01 PROCEDURE — 85025 COMPLETE CBC W/AUTO DIFF WBC: CPT

## 2024-05-01 PROCEDURE — 93970 EXTREMITY STUDY: CPT

## 2024-05-01 PROCEDURE — 81001 URINALYSIS AUTO W/SCOPE: CPT

## 2024-05-01 PROCEDURE — 6370000000 HC RX 637 (ALT 250 FOR IP): Performed by: OBSTETRICS & GYNECOLOGY

## 2024-05-01 PROCEDURE — 2580000003 HC RX 258: Performed by: OBSTETRICS & GYNECOLOGY

## 2024-05-01 PROCEDURE — 99232 SBSQ HOSP IP/OBS MODERATE 35: CPT | Performed by: OBSTETRICS & GYNECOLOGY

## 2024-05-01 RX ORDER — BUTALBITAL, ACETAMINOPHEN AND CAFFEINE 300; 40; 50 MG/1; MG/1; MG/1
1 CAPSULE ORAL EVERY 4 HOURS PRN
Status: DISCONTINUED | OUTPATIENT
Start: 2024-05-01 | End: 2024-05-02 | Stop reason: HOSPADM

## 2024-05-01 RX ORDER — BETAMETHASONE SODIUM PHOSPHATE AND BETAMETHASONE ACETATE 3; 3 MG/ML; MG/ML
12 INJECTION, SUSPENSION INTRA-ARTICULAR; INTRALESIONAL; INTRAMUSCULAR; SOFT TISSUE DAILY
Status: COMPLETED | OUTPATIENT
Start: 2024-05-01 | End: 2024-05-02

## 2024-05-01 RX ORDER — PANTOPRAZOLE SODIUM 40 MG/1
40 TABLET, DELAYED RELEASE ORAL
Status: DISCONTINUED | OUTPATIENT
Start: 2024-05-02 | End: 2024-05-02 | Stop reason: HOSPADM

## 2024-05-01 RX ADMIN — ZOLPIDEM TARTRATE 5 MG: 5 TABLET ORAL at 00:12

## 2024-05-01 RX ADMIN — SODIUM CHLORIDE, POTASSIUM CHLORIDE, SODIUM LACTATE AND CALCIUM CHLORIDE: 600; 310; 30; 20 INJECTION, SOLUTION INTRAVENOUS at 08:01

## 2024-05-01 RX ADMIN — LABETALOL HYDROCHLORIDE 200 MG: 200 TABLET, FILM COATED ORAL at 20:24

## 2024-05-01 RX ADMIN — BUTALBITA,ACETAMINOPHEN AND CAFFEINE 1 CAPSULE: 50; 300; 40 CAPSULE ORAL at 13:34

## 2024-05-01 RX ADMIN — ACETAMINOPHEN 325MG 650 MG: 325 TABLET ORAL at 10:55

## 2024-05-01 RX ADMIN — LABETALOL HYDROCHLORIDE 200 MG: 200 TABLET, FILM COATED ORAL at 08:02

## 2024-05-01 RX ADMIN — ACETAMINOPHEN 325MG 650 MG: 325 TABLET ORAL at 20:25

## 2024-05-01 RX ADMIN — SODIUM CHLORIDE, POTASSIUM CHLORIDE, SODIUM LACTATE AND CALCIUM CHLORIDE 999 ML: 600; 310; 30; 20 INJECTION, SOLUTION INTRAVENOUS at 22:12

## 2024-05-01 ASSESSMENT — PAIN SCALES - GENERAL: PAINLEVEL_OUTOF10: 5

## 2024-05-01 NOTE — PROGRESS NOTES
Patient medicated with Tums for heartburn.  Patient mentions to nurse the bottom of her right food hurts amberly she walks on it.  It is sensitive to touch and flinches her leg back when nurse assess and lightly touches it.  States she has pain to upper calf by knee when homen is done. This is a change from morning assessment. Calf is soft to touch, patient states hurts when she brings back foot toward abd. Dr. Ambriz is notified, verbalized understanding,

## 2024-05-01 NOTE — PROGRESS NOTES
Nurse in patients room,  patient is crying stating the pain in her right foot and back of knee is hurting.  Rating pain level at \"8\" - waiting for ultrasound to do doppler.

## 2024-05-01 NOTE — PROGRESS NOTES
PROGRESS NOTE  Patient is a 23-year-old  2 para 1-0-0-1 at 34 weeks and 6 days who came in with elevated blood pressures.  She was checking her pressures at home and the systolics were in the 180s she was having vision changes and headache.  She came in blood pressure was in the 150s started on labetalol 200 mg twice daily.  24-hour urine is in progress.  She received 1 dose of betamethasone.  Her labs are all normal.  Her PC ratio was 0.2  Nonstress test is reactive indication is gestational hypertension duration 20 minutes positive accelerations negative decels  baseline in the 140s stimulation required no  Impression IUP at 34 weeks and 6 days  Gestational hypertension   Plan patient to continue observation status.  Continue the collection of the 24-hour urine observe blood pressures and possible discharge later today

## 2024-05-01 NOTE — FLOWSHEET NOTE
Dr Ambriz at bedside Gc/Chlam culture obtained. Pt resting Medicated for C/O headache and lights dimmed for comfort.

## 2024-05-01 NOTE — FLOWSHEET NOTE
C/O sharp pain on R side of back and heartburn. Pt lying flat position changed warm blanket to back HOB elevated. Pretzels and Gingerale provided. Pt admits to relief.

## 2024-05-01 NOTE — FLOWSHEET NOTE
Pt arrived to triage with complaint of increased blood pressures at home in 180's. Pt states increased swelling of her feet and a \"little headache\" today. UA collected and sent to lab. Pt denies contractions, LOF or vaginal bleeding.

## 2024-05-01 NOTE — H&P
Department of Obstetrics and Gynecology  History and Physical    Pt Name: Chuyita Reed  MRN: [id number] Acct #: [id number]  YOB: 2000  Estimated Date of Delivery: 24      HPI: The patient is a 23 y.o.  34w5d female who presents to OB triage for elevated BP at home (180/110). Pt states she also has increased swelling and a headache. Pt has been monitoring BP due to elevated BPs on last triage visit and >300mg/TV (338) on 24hr urine collection. Pt with h/o TSVD x 1 and prenatal care with [facility].   +FM, -VB, -LOF, -CTX    Allergies:    Allergies as of 2024 - Fully Reviewed 2024   Allergen Reaction Noted    Latex Itching 2024    Amoxicillin Itching 2024    Penicillins Itching 2024       Medications:  No current facility-administered medications for this encounter.    OB History:     Gyn History: Denies h/o abnormal pap smear, h/o STDs.     Past Medical History:   Past Medical History:   Diagnosis Date    Mental disorder     anxiety       Past Surgical History:   Past Surgical History:   Procedure Laterality Date     SECTION         Social History:   Social History     Tobacco Use   Smoking Status Never   Smokeless Tobacco Former        Family History: Noncontributory; Denies h/o cancer.     ROS:  Negative except as stated in HPI, denies nausea, vomiting, fever, chills, headache or dysuria.     PE:  There were no vitals filed for this visit.    General: well nourished, well developed, in no acute distress  CV: Normal heart sounds  Resp: breathing unlabored  Abdomen: Nontender, no rebound, no guarding  FH: 36  Cx: deferred    NST : For pre-eclampsia  FHR 140s, moderate variability, +accels, -decels  Ctx: none  Cat 1, reactive  Time for NST: approx 20-40min, please see NST strip for additional information    Labs:   [facility] labs pending    Assessment:   23 y.o.  34w5d female with pre-eclampsia, r/o severe pre-eclampsi    Plan:   Admission for  24hr urine collection and ANS  Plan to start labetalol 200mg BID and monitor for pre-eclampsia with severe features    Shante Adler MD

## 2024-05-01 NOTE — PLAN OF CARE
Problem: Pain  Goal: Verbalizes/displays adequate comfort level or baseline comfort level  Outcome: Progressing     Problem: Vaginal Birth or  Section  Goal: Fetal and maternal status remain reassuring during the birth process  Description:  Birth OB-Pregnancy care plan goal which identifies if the fetal and maternal status remain reassuring during the birth process  Outcome: Progressing     Problem: Chronic Conditions and Co-morbidities  Goal: Patient's chronic conditions and co-morbidity symptoms are monitored and maintained or improved  Outcome: Progressing

## 2024-05-02 ENCOUNTER — HOSPITAL ENCOUNTER (OUTPATIENT)
Age: 24
Setting detail: OBSERVATION
LOS: 1 days | Discharge: HOME | End: 2024-05-03
Attending: OBSTETRICS & GYNECOLOGY | Admitting: OBSTETRICS & GYNECOLOGY
Payer: COMMERCIAL

## 2024-05-02 VITALS
OXYGEN SATURATION: 99 % | TEMPERATURE: 98.1 F | HEART RATE: 96 BPM | SYSTOLIC BLOOD PRESSURE: 126 MMHG | DIASTOLIC BLOOD PRESSURE: 72 MMHG | RESPIRATION RATE: 18 BRPM

## 2024-05-02 DIAGNOSIS — O14.93 PRE-ECLAMPSIA IN THIRD TRIMESTER: Primary | ICD-10-CM

## 2024-05-02 LAB
AMPHET UR QL SCN: ABNORMAL
BARBITURATES UR QL SCN: POSITIVE
BENZODIAZ UR QL SCN: ABNORMAL
BILIRUB UR QL STRIP: NEGATIVE
CANNABINOIDS UR QL SCN: ABNORMAL
CLARITY UR: CLEAR
COCAINE UR QL SCN: ABNORMAL
COLOR UR: YELLOW
CREAT 24H UR-MRATE: 1.85 G/TV (ref 0.8–1.5)
CREAT UR-MCNC: 29.1 MG/DL
CREAT UR-MCNC: 53.6 MG/DL
DRUG SCREEN COMMENT UR-IMP: ABNORMAL
FENTANYL SCREEN, URINE: ABNORMAL
GLUCOSE UR STRIP-MCNC: NEGATIVE MG/DL
HGB UR QL STRIP: NEGATIVE
KETONES UR STRIP-MCNC: NEGATIVE MG/DL
LEUKOCYTE ESTERASE UR QL STRIP: NEGATIVE
METHADONE UR QL SCN: ABNORMAL
NITRITE UR QL STRIP: NEGATIVE
OPIATES UR QL SCN: ABNORMAL
OXYCODONE UR QL SCN: ABNORMAL
PCP UR QL SCN: ABNORMAL
PH UR STRIP: 7 [PH] (ref 5–9)
PROPOXYPH UR QL SCN: ABNORMAL
PROT 24H UR-MRATE: 483 MG/TV (ref 0–200)
PROT UR STRIP-MCNC: NEGATIVE MG/DL
PROT UR-MCNC: 11 MG/DL
PROT/CREAT UR-RTO: 0.4 ML/ML
PROT/CREAT UR-RTO: 0.4 ML/ML (ref 0–0.2)
REASON FOR REJECTION: NORMAL
REJECTED TEST: NORMAL
SP GR UR STRIP: 1.01 (ref 1–1.03)
SPECIMEN VOL 24H UR: 3450 ML
URINE REFLEX TO CULTURE: NORMAL
UROBILINOGEN UR STRIP-ACNC: 0.2 E.U./DL

## 2024-05-02 PROCEDURE — 82565 ASSAY OF CREATININE: CPT

## 2024-05-02 PROCEDURE — 99239 HOSP IP/OBS DSCHRG MGMT >30: CPT | Performed by: OBSTETRICS & GYNECOLOGY

## 2024-05-02 PROCEDURE — 84156 ASSAY OF PROTEIN URINE: CPT

## 2024-05-02 PROCEDURE — 59025 FETAL NON-STRESS TEST: CPT

## 2024-05-02 PROCEDURE — 87340 HEPATITIS B SURFACE AG IA: CPT

## 2024-05-02 PROCEDURE — 83615 LACTATE (LD) (LDH) ENZYME: CPT

## 2024-05-02 PROCEDURE — 6370000000 HC RX 637 (ALT 250 FOR IP): Performed by: OBSTETRICS & GYNECOLOGY

## 2024-05-02 PROCEDURE — 81003 URINALYSIS AUTO W/O SCOPE: CPT

## 2024-05-02 PROCEDURE — 99222 1ST HOSP IP/OBS MODERATE 55: CPT | Performed by: OBSTETRICS & GYNECOLOGY

## 2024-05-02 PROCEDURE — 6360000002 HC RX W HCPCS: Performed by: OBSTETRICS & GYNECOLOGY

## 2024-05-02 PROCEDURE — 87086 URINE CULTURE/COLONY COUNT: CPT

## 2024-05-02 PROCEDURE — 87186 SC STD MICRODIL/AGAR DIL: CPT

## 2024-05-02 PROCEDURE — 84520 ASSAY OF UREA NITROGEN: CPT

## 2024-05-02 PROCEDURE — 86762 RUBELLA ANTIBODY: CPT

## 2024-05-02 PROCEDURE — 59025 FETAL NON-STRESS TEST: CPT | Performed by: OBSTETRICS & GYNECOLOGY

## 2024-05-02 PROCEDURE — G0378 HOSPITAL OBSERVATION PER HR: HCPCS

## 2024-05-02 PROCEDURE — 87077 CULTURE AEROBIC IDENTIFY: CPT

## 2024-05-02 PROCEDURE — 86850 RBC ANTIBODY SCREEN: CPT

## 2024-05-02 PROCEDURE — 86900 BLOOD TYPING SEROLOGIC ABO: CPT

## 2024-05-02 PROCEDURE — 2580000003 HC RX 258: Performed by: OBSTETRICS & GYNECOLOGY

## 2024-05-02 PROCEDURE — 80307 DRUG TEST PRSMV CHEM ANLYZR: CPT

## 2024-05-02 PROCEDURE — 86592 SYPHILIS TEST NON-TREP QUAL: CPT

## 2024-05-02 PROCEDURE — 80076 HEPATIC FUNCTION PANEL: CPT

## 2024-05-02 PROCEDURE — 86901 BLOOD TYPING SEROLOGIC RH(D): CPT

## 2024-05-02 RX ORDER — NIFEDIPINE 30 MG/1
30 TABLET, EXTENDED RELEASE ORAL DAILY
Status: DISCONTINUED | OUTPATIENT
Start: 2024-05-02 | End: 2024-05-02 | Stop reason: HOSPADM

## 2024-05-02 RX ORDER — CEPHALEXIN 500 MG/1
500 CAPSULE ORAL 4 TIMES DAILY
Qty: 28 CAPSULE | Refills: 0 | Status: SHIPPED | OUTPATIENT
Start: 2024-05-02

## 2024-05-02 RX ORDER — SODIUM CHLORIDE 0.9 % (FLUSH) 0.9 %
5-40 SYRINGE (ML) INJECTION PRN
Status: DISCONTINUED | OUTPATIENT
Start: 2024-05-02 | End: 2024-05-03 | Stop reason: HOSPADM

## 2024-05-02 RX ORDER — SODIUM CHLORIDE 9 MG/ML
INJECTION, SOLUTION INTRAVENOUS PRN
Status: DISCONTINUED | OUTPATIENT
Start: 2024-05-02 | End: 2024-05-03 | Stop reason: HOSPADM

## 2024-05-02 RX ORDER — HYDRALAZINE HYDROCHLORIDE 20 MG/ML
10 INJECTION INTRAMUSCULAR; INTRAVENOUS
Status: DISCONTINUED | OUTPATIENT
Start: 2024-05-02 | End: 2024-05-03 | Stop reason: HOSPADM

## 2024-05-02 RX ORDER — LABETALOL HYDROCHLORIDE 5 MG/ML
40 INJECTION, SOLUTION INTRAVENOUS
Status: DISCONTINUED | OUTPATIENT
Start: 2024-05-02 | End: 2024-05-03 | Stop reason: HOSPADM

## 2024-05-02 RX ORDER — ACETAMINOPHEN 500 MG
1000 TABLET ORAL EVERY 6 HOURS PRN
Status: DISCONTINUED | OUTPATIENT
Start: 2024-05-02 | End: 2024-05-03 | Stop reason: HOSPADM

## 2024-05-02 RX ORDER — FAMOTIDINE 20 MG/1
20 TABLET, FILM COATED ORAL 2 TIMES DAILY
Status: DISCONTINUED | OUTPATIENT
Start: 2024-05-02 | End: 2024-05-03 | Stop reason: HOSPADM

## 2024-05-02 RX ORDER — SODIUM CHLORIDE 0.9 % (FLUSH) 0.9 %
5-40 SYRINGE (ML) INJECTION EVERY 12 HOURS SCHEDULED
Status: DISCONTINUED | OUTPATIENT
Start: 2024-05-02 | End: 2024-05-03 | Stop reason: HOSPADM

## 2024-05-02 RX ORDER — CEPHALEXIN 500 MG/1
500 CAPSULE ORAL EVERY 6 HOURS SCHEDULED
Status: DISCONTINUED | OUTPATIENT
Start: 2024-05-02 | End: 2024-05-03 | Stop reason: HOSPADM

## 2024-05-02 RX ORDER — LABETALOL 200 MG/1
200 TABLET, FILM COATED ORAL EVERY 12 HOURS SCHEDULED
Status: DISCONTINUED | OUTPATIENT
Start: 2024-05-02 | End: 2024-05-03 | Stop reason: HOSPADM

## 2024-05-02 RX ORDER — LABETALOL 200 MG/1
200 TABLET, FILM COATED ORAL EVERY 12 HOURS SCHEDULED
Qty: 60 TABLET | Refills: 3 | Status: SHIPPED | OUTPATIENT
Start: 2024-05-02

## 2024-05-02 RX ORDER — SODIUM CHLORIDE, SODIUM LACTATE, POTASSIUM CHLORIDE, CALCIUM CHLORIDE 600; 310; 30; 20 MG/100ML; MG/100ML; MG/100ML; MG/100ML
INJECTION, SOLUTION INTRAVENOUS CONTINUOUS
Status: DISCONTINUED | OUTPATIENT
Start: 2024-05-02 | End: 2024-05-03 | Stop reason: HOSPADM

## 2024-05-02 RX ORDER — LABETALOL HYDROCHLORIDE 5 MG/ML
20 INJECTION, SOLUTION INTRAVENOUS
Status: DISCONTINUED | OUTPATIENT
Start: 2024-05-02 | End: 2024-05-03 | Stop reason: HOSPADM

## 2024-05-02 RX ORDER — NIFEDIPINE 30 MG/1
30 TABLET, EXTENDED RELEASE ORAL DAILY
Qty: 30 TABLET | Refills: 0 | Status: SHIPPED | OUTPATIENT
Start: 2024-05-02

## 2024-05-02 RX ORDER — HYDRALAZINE HYDROCHLORIDE 20 MG/ML
5 INJECTION INTRAMUSCULAR; INTRAVENOUS
Status: DISCONTINUED | OUTPATIENT
Start: 2024-05-02 | End: 2024-05-03 | Stop reason: HOSPADM

## 2024-05-02 RX ORDER — NIFEDIPINE 30 MG/1
30 TABLET, EXTENDED RELEASE ORAL DAILY
Status: DISCONTINUED | OUTPATIENT
Start: 2024-05-03 | End: 2024-05-03 | Stop reason: HOSPADM

## 2024-05-02 RX ADMIN — ACETAMINOPHEN 1000 MG: 500 TABLET ORAL at 23:26

## 2024-05-02 RX ADMIN — LABETALOL HYDROCHLORIDE 200 MG: 200 TABLET, FILM COATED ORAL at 09:40

## 2024-05-02 RX ADMIN — ANTACID TABLETS 1000 MG: 500 TABLET, CHEWABLE ORAL at 01:20

## 2024-05-02 RX ADMIN — NIFEDIPINE 30 MG: 30 TABLET, EXTENDED RELEASE ORAL at 09:40

## 2024-05-02 RX ADMIN — SODIUM CHLORIDE, POTASSIUM CHLORIDE, SODIUM LACTATE AND CALCIUM CHLORIDE: 600; 310; 30; 20 INJECTION, SOLUTION INTRAVENOUS at 23:55

## 2024-05-02 RX ADMIN — PANTOPRAZOLE SODIUM 40 MG: 40 TABLET, DELAYED RELEASE ORAL at 09:40

## 2024-05-02 RX ADMIN — BETAMETHASONE SODIUM PHOSPHATE AND BETAMETHASONE ACETATE 12 MG: 3; 3 INJECTION, SUSPENSION INTRA-ARTICULAR; INTRALESIONAL; INTRAMUSCULAR at 01:22

## 2024-05-02 RX ADMIN — CEPHALEXIN 500 MG: 500 CAPSULE ORAL at 23:27

## 2024-05-02 RX ADMIN — ZOLPIDEM TARTRATE 5 MG: 5 TABLET ORAL at 01:30

## 2024-05-02 ASSESSMENT — PAIN DESCRIPTION - LOCATION: LOCATION: BACK;OTHER (COMMENT)

## 2024-05-02 NOTE — DISCHARGE SUMMARY
DISCHARGE SUMMARY  23-year-old  2 para 1-0-0-1.  35 weeks.  Gestational hypertension.  24-hour urine protein is 54 mg.  Her blood pressure is adequately controlled.  Couple of recent diet systolics 145.  Baby has been moving adequately.  No contractions.  The remainder of her lab work is good.  Impression :gestational hypertension  IUP at 35 weeks  Plan: Discharged today follow-up on Saturday for an NST and a blood pressure check as well as [facility] labs.  Procardia 30 mg XL 1 daily  Labetalol 200 mg twice daily p.o.  Encouraged bedrest  Return if there are any elevations of her blood pressure or symptoms suggestive of increased blood pressure like right upper quadrant pain headaches or visual field changes  Will do an NST prior to discharge

## 2024-05-02 NOTE — FLOWSHEET NOTE
Call to Dr. Abarca to inform of protein, 24 hour urine result from 4/30 142.1 decrease to 53.6 on 5/1 and urinalysis 5/1 + nitrites. Culture was not ordered, does Dr. Abarca want a culture added? Per Dr. Abarca will be in today to examine patient and yes, add on urine culture. No further orders received at this time.    Called lab, Spoke to Sahil to see if enough urine is available to  add -on for urine culture. Per Sahil ok to add on Urine culture.

## 2024-05-02 NOTE — PROGRESS NOTES
CLINICAL PHARMACY NOTE: MEDS TO BEDS    Total # of Prescriptions Filled: 3   The following medications were delivered to the patient:  Nifedipine Er 30 mg Tab  Labetalol 200 mg Tab  Cephalexin 500 mg Cap    Additional Documentation:

## 2024-05-02 NOTE — PROCEDURES
23 y.o.  @ 35w0d      The indication for antepartum testing is Hypertensive Disease in Pregnancy-Gestational Hypertension    Fetal Heart Rate: Category I: (Reactive)  Baseline Heart Rate: 130s     Accelerations greater than or equal to 2 accelerations in 20 minutes?  yes  Accelerations greater than or equal to 15 BPM above baseline, for at least 15 seconds for gestations >32 weeks?  Or Accelerations greater than or equal to 10 BPM above baseline, for at least 10 seconds for gestations <32 weeks?     (35w0d )  YES    Decelerations: None  Contractions: No    Acoustic Stimulator: No  Uterine Irritability: No    TEST RESULTS : NST  REACTIVE    Comments: Continue current plan of care.     Recommendations: Upon Discharge, Return on 2024 for repeat NST, repeat PIH labs and BP check

## 2024-05-02 NOTE — FLOWSHEET NOTE
Called dr menjivar to give him an update of results of 24 hour urine total protein. no answer, left message to call back

## 2024-05-02 NOTE — FLOWSHEET NOTE
Called in house provider to review EFM, per provider \" continue to monitor until strip reactive.\"

## 2024-05-02 NOTE — PLAN OF CARE
Problem: Pain  Goal: Verbalizes/displays adequate comfort level or baseline comfort level  2024 1147 by Sarah Ko RN  Outcome: Completed  2024 07 by Sarah Ko RN  Outcome: Progressing     Problem: Vaginal Birth or  Section  Goal: Fetal and maternal status remain reassuring during the birth process  Description:  Birth OB-Pregnancy care plan goal which identifies if the fetal and maternal status remain reassuring during the birth process  2024 1147 by Sarah Ko RN  Outcome: Completed  2024 07 by Sarah Ko RN  Outcome: Progressing     Problem: Chronic Conditions and Co-morbidities  Goal: Patient's chronic conditions and co-morbidity symptoms are monitored and maintained or improved  2024 1147 by Sarah Ko RN  Outcome: Completed  2024 by Sarah Ko RN  Outcome: Progressing

## 2024-05-02 NOTE — PLAN OF CARE
Problem: Pain  Goal: Verbalizes/displays adequate comfort level or baseline comfort level  2024 by Sarah Ko RN  Outcome: Progressing  2024 by Sarah Nova RN  Outcome: Progressing     Problem: Vaginal Birth or  Section  Goal: Fetal and maternal status remain reassuring during the birth process  Description:  Birth OB-Pregnancy care plan goal which identifies if the fetal and maternal status remain reassuring during the birth process  2024 by Sarah Ko RN  Outcome: Progressing  2024 by Sarah Nova RN  Outcome: Progressing     Problem: Chronic Conditions and Co-morbidities  Goal: Patient's chronic conditions and co-morbidity symptoms are monitored and maintained or improved  2024 by Sarah Ko RN  Outcome: Progressing  2024 by Sarah Nova RN  Outcome: Progressing

## 2024-05-02 NOTE — FLOWSHEET NOTE
Discharge instructions reviewed with patient. Review patient blood pressure precautions encourage patient to check blood pressure at home. Patient is scheduled for Saturday at noon. Provider would like to see patient on Wednesday in the office

## 2024-05-02 NOTE — PLAN OF CARE
Problem: Pain  Goal: Verbalizes/displays adequate comfort level or baseline comfort level  2024 by Sarah Nova RN  Outcome: Progressing  2024 1232 by Rosa Maria Patrick RN  Outcome: Progressing     Problem: Vaginal Birth or  Section  Goal: Fetal and maternal status remain reassuring during the birth process  Description:  Birth OB-Pregnancy care plan goal which identifies if the fetal and maternal status remain reassuring during the birth process  2024 by Sarah Nova RN  Outcome: Progressing  2024 1232 by Rosa Maria Patrick RN  Outcome: Progressing     Problem: Chronic Conditions and Co-morbidities  Goal: Patient's chronic conditions and co-morbidity symptoms are monitored and maintained or improved  2024 by Sarah Nova RN  Outcome: Progressing  2024 1232 by Rosa Maria Patrick RN  Outcome: Progressing

## 2024-05-02 NOTE — FLOWSHEET NOTE
Dr menjivar at bedside assessing patient. Dr menjivar reviewed labs. orders received to start antibiotics for bladder infection. Patient to continue labetalol and procardia for blood pressure and orders received to discharge.

## 2024-05-03 ENCOUNTER — APPOINTMENT (OUTPATIENT)
Dept: ULTRASOUND IMAGING | Age: 24
End: 2024-05-03
Payer: COMMERCIAL

## 2024-05-03 ENCOUNTER — ANESTHESIA EVENT (OUTPATIENT)
Dept: LABOR AND DELIVERY | Age: 24
End: 2024-05-03
Payer: OTHER MISCELLANEOUS

## 2024-05-03 ENCOUNTER — ANESTHESIA (OUTPATIENT)
Dept: LABOR AND DELIVERY | Age: 24
End: 2024-05-03
Payer: OTHER MISCELLANEOUS

## 2024-05-03 ENCOUNTER — HOSPITAL ENCOUNTER (OUTPATIENT)
Age: 24
Setting detail: OBSERVATION
Discharge: HOME | End: 2024-05-04
Attending: STUDENT IN AN ORGANIZED HEALTH CARE EDUCATION/TRAINING PROGRAM | Admitting: STUDENT IN AN ORGANIZED HEALTH CARE EDUCATION/TRAINING PROGRAM
Payer: OTHER MISCELLANEOUS

## 2024-05-03 VITALS
DIASTOLIC BLOOD PRESSURE: 57 MMHG | SYSTOLIC BLOOD PRESSURE: 108 MMHG | RESPIRATION RATE: 18 BRPM | TEMPERATURE: 98.1 F | HEART RATE: 93 BPM | OXYGEN SATURATION: 97 %

## 2024-05-03 DIAGNOSIS — M54.6 ACUTE THORACIC BACK PAIN, UNSPECIFIED BACK PAIN LATERALITY: ICD-10-CM

## 2024-05-03 DIAGNOSIS — R10.30 LOWER ABDOMINAL PAIN: ICD-10-CM

## 2024-05-03 DIAGNOSIS — R07.89 CHEST WALL PAIN: ICD-10-CM
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
## 2024-05-03 DIAGNOSIS — S09.90XA INJURY OF HEAD, INITIAL ENCOUNTER: ICD-10-CM

## 2024-05-03 LAB
ABO + RH BLD: NORMAL
ALBUMIN SERPL-MCNC: 3.6 G/DL (ref 3.5–4.6)
ALP SERPL-CCNC: 146 U/L (ref 40–130)
ALT SERPL-CCNC: 11 U/L (ref 0–33)
AST SERPL-CCNC: 15 U/L (ref 0–35)
BILIRUB DIRECT SERPL-MCNC: <0.2 MG/DL (ref 0–0.4)
BILIRUB INDIRECT SERPL-MCNC: ABNORMAL MG/DL (ref 0–0.6)
BILIRUB SERPL-MCNC: <0.2 MG/DL (ref 0.2–0.7)
BLD GP AB SCN SERPL QL: NORMAL
BUN SERPL-MCNC: 4 MG/DL (ref 6–20)
CREAT SERPL-MCNC: 0.42 MG/DL (ref 0.5–0.9)
ERYTHROCYTE [DISTWIDTH] IN BLOOD BY AUTOMATED COUNT: 14.4 % (ref 11.5–14.5)
HBV SURFACE AG SERPL QL IA: NORMAL
HCT VFR BLD AUTO: 35.6 % (ref 37–47)
HGB BLD-MCNC: 11.6 G/DL (ref 12–16)
LDH SERPL-CCNC: 166 U/L (ref 135–214)
MCH RBC QN AUTO: 28 PG (ref 27–31.3)
MCHC RBC AUTO-ENTMCNC: 32.6 % (ref 33–37)
MCV RBC AUTO: 86 FL (ref 79.4–94.8)
PLATELET # BLD AUTO: 202 K/UL (ref 130–400)
PROT SERPL-MCNC: 7 G/DL (ref 6.3–8)
RBC # BLD AUTO: 4.14 M/UL (ref 4.2–5.4)
RPR SER QL: NORMAL
RUBELLA ANTIBODY IGG: 19.3 IU/ML
WBC # BLD AUTO: 10.6 K/UL (ref 4.8–10.8)

## 2024-05-03 PROCEDURE — 2580000003 HC RX 258: Performed by: OBSTETRICS & GYNECOLOGY

## 2024-05-03 PROCEDURE — 76819 FETAL BIOPHYS PROFIL W/O NST: CPT

## 2024-05-03 PROCEDURE — 6370000000 HC RX 637 (ALT 250 FOR IP): Performed by: OBSTETRICS & GYNECOLOGY

## 2024-05-03 PROCEDURE — 6360000002 HC RX W HCPCS: Performed by: OBSTETRICS & GYNECOLOGY

## 2024-05-03 PROCEDURE — 96375 TX/PRO/DX INJ NEW DRUG ADDON: CPT

## 2024-05-03 PROCEDURE — 99282 EMERGENCY DEPT VISIT SF MDM: CPT

## 2024-05-03 PROCEDURE — 99239 HOSP IP/OBS DSCHRG MGMT >30: CPT | Performed by: OBSTETRICS & GYNECOLOGY

## 2024-05-03 PROCEDURE — 36415 COLL VENOUS BLD VENIPUNCTURE: CPT

## 2024-05-03 PROCEDURE — 59025 FETAL NON-STRESS TEST: CPT

## 2024-05-03 PROCEDURE — G0378 HOSPITAL OBSERVATION PER HR: HCPCS

## 2024-05-03 PROCEDURE — 85027 COMPLETE CBC AUTOMATED: CPT

## 2024-05-03 PROCEDURE — 96374 THER/PROPH/DIAG INJ IV PUSH: CPT

## 2024-05-03 PROCEDURE — 96361 HYDRATE IV INFUSION ADD-ON: CPT

## 2024-05-03 RX ORDER — DIPHENHYDRAMINE HYDROCHLORIDE 50 MG/ML
25 INJECTION INTRAMUSCULAR; INTRAVENOUS EVERY 6 HOURS PRN
Status: DISCONTINUED | OUTPATIENT
Start: 2024-05-03 | End: 2024-05-03 | Stop reason: HOSPADM

## 2024-05-03 RX ORDER — METOCLOPRAMIDE HYDROCHLORIDE 5 MG/ML
10 INJECTION INTRAMUSCULAR; INTRAVENOUS EVERY 6 HOURS
Status: DISCONTINUED | OUTPATIENT
Start: 2024-05-03 | End: 2024-05-03 | Stop reason: HOSPADM

## 2024-05-03 RX ORDER — CEPHALEXIN 500 MG/1
500 CAPSULE ORAL 4 TIMES DAILY
Qty: 28 CAPSULE | Refills: 0 | Status: SHIPPED | OUTPATIENT
Start: 2024-05-03 | End: 2024-05-10

## 2024-05-03 RX ORDER — ONDANSETRON 2 MG/ML
4 INJECTION INTRAMUSCULAR; INTRAVENOUS EVERY 6 HOURS PRN
Status: DISCONTINUED | OUTPATIENT
Start: 2024-05-03 | End: 2024-05-03 | Stop reason: HOSPADM

## 2024-05-03 RX ORDER — SODIUM CHLORIDE 9 MG/ML
INJECTION, SOLUTION INTRAVENOUS ONCE
Status: COMPLETED | OUTPATIENT
Start: 2024-05-03 | End: 2024-05-03

## 2024-05-03 RX ORDER — CYCLOBENZAPRINE HCL 10 MG
10 TABLET ORAL 3 TIMES DAILY PRN
Status: DISCONTINUED | OUTPATIENT
Start: 2024-05-03 | End: 2024-05-03 | Stop reason: HOSPADM

## 2024-05-03 RX ADMIN — NIFEDIPINE 30 MG: 30 TABLET, EXTENDED RELEASE ORAL at 08:54

## 2024-05-03 RX ADMIN — ACETAMINOPHEN 1000 MG: 500 TABLET ORAL at 06:19

## 2024-05-03 RX ADMIN — SODIUM CHLORIDE, PRESERVATIVE FREE 10 ML: 5 INJECTION INTRAVENOUS at 11:18

## 2024-05-03 RX ADMIN — CEPHALEXIN 500 MG: 500 CAPSULE ORAL at 11:30

## 2024-05-03 RX ADMIN — ONDANSETRON 4 MG: 2 INJECTION INTRAMUSCULAR; INTRAVENOUS at 01:01

## 2024-05-03 RX ADMIN — METOCLOPRAMIDE HYDROCHLORIDE 10 MG: 5 INJECTION INTRAMUSCULAR; INTRAVENOUS at 11:14

## 2024-05-03 RX ADMIN — FAMOTIDINE 20 MG: 20 TABLET, FILM COATED ORAL at 08:54

## 2024-05-03 RX ADMIN — ACETAMINOPHEN 1000 MG: 500 TABLET ORAL at 11:14

## 2024-05-03 RX ADMIN — CEPHALEXIN 500 MG: 500 CAPSULE ORAL at 06:19

## 2024-05-03 RX ADMIN — LABETALOL HYDROCHLORIDE 200 MG: 200 TABLET, FILM COATED ORAL at 08:54

## 2024-05-03 RX ADMIN — CYCLOBENZAPRINE 10 MG: 10 TABLET, FILM COATED ORAL at 15:15

## 2024-05-03 RX ADMIN — SODIUM CHLORIDE: 9 INJECTION, SOLUTION INTRAVENOUS at 11:28

## 2024-05-03 ASSESSMENT — PAIN DESCRIPTION - DESCRIPTORS
DESCRIPTORS: THROBBING
DESCRIPTORS: CRAMPING
DESCRIPTORS: SHARP;ACHING
DESCRIPTORS: BURNING

## 2024-05-03 ASSESSMENT — PAIN DESCRIPTION - LOCATION
LOCATION: ABDOMEN
LOCATION: LEG
LOCATION: BACK;HEAD
LOCATION: HEAD

## 2024-05-03 ASSESSMENT — PAIN SCALES - GENERAL
PAINLEVEL_OUTOF10: 7
PAINLEVEL_OUTOF10: 8
PAINLEVEL_OUTOF10: 5
PAINLEVEL_OUTOF10: 5

## 2024-05-03 ASSESSMENT — PAIN - FUNCTIONAL ASSESSMENT: PAIN_FUNCTIONAL_ASSESSMENT: 0-10

## 2024-05-03 ASSESSMENT — PAIN DESCRIPTION - ORIENTATION: ORIENTATION: LEFT

## 2024-05-03 NOTE — FLOWSHEET NOTE
Call to Dr. Naranjo regarding patient complaints and recent admission and discharge today. States he will be in to see patient.

## 2024-05-03 NOTE — FLOWSHEET NOTE
Patient in triage complaining of contraction pain, headache, blurry vision and high blood pressure when she took it at home. Patient was discharged earlier today after being observed on the unit for 24 hour urine. Patients states headache and visual changes started when she left the hospital. Tried taking a nap and that didn't help. So she took her blood pressure and it was high per patient so she took her blood pressure medication at 5pm and took a shower. After the shower she states her blood pressure was still high. After seeing her blood pressure was still high, she decided to come to the hospital. Patient reports contraction pain being on her right side and pain in her lower back. She reported being put on antibiotics for bladder infection.  Patient states pain 12/10. Denies vaginal bleeding or leakage of fluid. Denies intercourse in the last 24 hours.

## 2024-05-03 NOTE — FLOWSHEET NOTE
Dr. Ambriz notified of pt. Concerns with ongoing headache and leg pain.  Dr. Ambriz will be out to see patient.

## 2024-05-03 NOTE — FLOWSHEET NOTE
Called Dr. Abarca and updated on Pt condition at this time. Notified of pts ongoing headache and ordered medications not helping. New orders received.

## 2024-05-03 NOTE — FLOWSHEET NOTE
Pt continues to complain of headache.  Pt informed this nurse if she were to be discharged she would return with the same concern.  Blood pressure taken at this time.  Pt. Informed blood pressures are WNL. Will notify physician.

## 2024-05-03 NOTE — PLAN OF CARE
Problem: Pain  Goal: Verbalizes/displays adequate comfort level or baseline comfort level  Outcome: Progressing     Problem: Safety - Adult  Goal: Free from fall injury  Outcome: Progressing     Problem: Chronic Conditions and Co-morbidities  Goal: Patient's chronic conditions and co-morbidity symptoms are monitored and maintained or improved  Outcome: Progressing     Problem: Risk for Maternal Injury  Goal: Remains free from seizures and injury related to seizure activity  Description: INTERVENTIONS:.  -Maintain airway, patient safety and administer oxygen as ordered  -Monitor patient for seizure activity, document and report duration and descrition  -If Seizure occurs, turn patient to dide and suction secretions as needed  -Reorient patient post seizure  -Seizure Pads  -Instruct patient/family to notify RN of any seizure activity  -Instruct patient/family to call for assistance with activity based on assessment  Outcome: Progressing     Problem: Risk for Decreased Cardiac Output  Goal: Maintains optimal cardiac output and hemodynamic stability  Description: INTERVENTIONS:.  -Monitor blood pressure and heart rate  -Monitor urine output and notify licensed practitioner for values outside of   -Assess for signes of decreased cardiac output  -Administer fluid and /or volume expanders as ordered    Outcome: Progressing  Goal: Achieves optimal ventilation and oxygenation  Description: INTERVENTIONS:.  -Assess for changes in respiratory status   -Assess for changes in mentation and behavior  -Position to facilitate oxygenation and minimize respiratory effort  -Oxygen supplementation based on oxygen saturation or arterial blood gases  -Initiate smoking cessation protocol as indicated  -Encourage broncho-pulmonary hygiene including cough, deep breathe, incentive spirometry  -Assess the need for suctioning and aspirate as needed  -Assess and instruct to report shortness of breath or any respiratory  difficulty  -Respiratory therapy support as indicated      Outcome: Progressing     Problem: Risk for Deficient Fluid Volume  Goal: Hemodynamic stability and optimal renal function maintained  Description: Interventions:.  -Monitor labs and assess for signs and symptoms of volume excess or deficit    -Monitor intake, output and patient weight  -Monitor response to interventions for patient's volume status, including labs, urine output, blood pressure (other measures as available)  -Fluid restriction as ordered  -Instruct patient on fluid and nutrition restrictions as appropriate      Outcome: Progressing

## 2024-05-03 NOTE — H&P
3450  mL Final    Creatinine, Ur 2024 53.6  mg/dL Final    Creatinine, 24H Ur 2024 1.85 (H)  0.80 - 1.50 G/TV Final    Protein, 24H Urine 2024 483.0 (H)  0.0 - 200.0 mg/TV Final    Protein, Ur 2024 22.0  Not Established mg/dL Final    Creatinine, Ur 2024 142.1  Not Established mg/dL Final    Protein/Creat Ratio 2024 0.2  0.0 - 0.2 mL/mL Final    Protein/Creat Ratio 2024 0.2  mL/mL Final    Hep B S Ag Interp 2024 Non-reactive   Final    Culture GBS with PCN Allergy 2024    Preliminary                    Value:Culture GBS PCN Allergy:  CULTURE IN PROGRESS  Performed at [facility] [address] [place] [id number]  [phone]      Bacteria, UA 2024 MANY (A)  Negative /HPF Final    Hyaline Casts, UA 2024 0-1  0 - 5 /HPF Final    WBC, UA 2024 0-2  0 - 5 /HPF Final    RBC, UA 2024 0-2  0 - 5 /HPF Final    Epithelial Cells, UA 2024 3-5  0 - 5 /HPF Final         ASSESSMENT:    The patient is a 23 y.o. female  35w0d with :     Known Mild pre-eclampsia   Comes in with a headache and elevated Bps 140 -149/ 90's mmhg on BP meds already , patient states she had measurements in the 160'S at home .     Will admit and observe until AM with frequent BP checks   S/p BMZ x 2     There are no active hospital problems to display for this patient.        Orders Placed This Encounter   Procedures    Urine Drug Screen    Urinalysis with Reflex to Culture    ADULT DIET; Clear Liquid    Vitals signs per unit routine    Notify physician    Ambulate patient    Fetal Heart Rate via doppler if less than 24 weeks gestation    External fetal monitoring if greater than 24 weeks gestation    Assess fetal movement    Assess fetal heart rate and uterine contractions    Cervical checks unless contraindicated i.e. less than 34 weeks with possible rupture of membranes (ROM), unexplained bleeding or known previa    Quantitative blood loss (QBL) if indicated     Pulse Oximetry - Spot check     No orders of the defined types were placed in this encounter.      PLAN:  Admit for observation and BP checks for possible progression to severe pre-eclampsia.   Will continue home meds.   High Bp protocol implemented.   Continous NST / surendra Naranjo MD  5/2/2024

## 2024-05-03 NOTE — FLOWSHEET NOTE
Dr. Naranjo in to see patient. Orders to keep patient over night for observation. Ultrasound to be done in the morning.

## 2024-05-03 NOTE — ANESTHESIA PRE PROCEDURE
Department of Anesthesiology  Preprocedure Note       Name:  Chuyita Reed   Age:  23 y.o.  :  2000                                          MRN:  [id number]         Date:  5/3/2024      Surgeon: * No surgeons listed *    Procedure: * No procedures listed *    Medications prior to admission:   Prior to Admission medications    Medication Sig Start Date End Date Taking? Authorizing Provider   labetalol (NORMODYNE) 200 MG tablet Take 1 tablet by mouth every 12 hours 24   Trung Abarca,    cephALEXin (KEFLEX) 500 MG capsule Take 1 capsule by mouth 4 times daily 24   Trung Abarca DO   NIFEdipine (PROCARDIA XL) 30 MG extended release tablet Take 1 tablet by mouth daily 24   Trung Abarca DO   Prenatal MV-Min-Fe Fum-FA-DHA (PRENATAL 1 PO) Take 1 tablet by mouth daily    Provider, MD Doretha       Current medications:    Current Facility-Administered Medications   Medication Dose Route Frequency Provider Last Rate Last Admin    ondansetron (ZOFRAN) injection 4 mg  4 mg IntraVENous Q6H PRN Aimee Naranjo MD   4 mg at 24 0101    lactated ringers IV soln infusion   IntraVENous Continuous Aimee Naranjo  mL/hr at 24 2355 New Bag at 24 2355    sodium chloride flush 0.9 % injection 5-40 mL  5-40 mL IntraVENous 2 times per day Aimee Naranjo MD        sodium chloride flush 0.9 % injection 5-40 mL  5-40 mL IntraVENous PRN Aimee Naranjo MD        0.9 % sodium chloride infusion   IntraVENous PRN Aimee Naranjo MD        famotidine (PEPCID) tablet 20 mg  20 mg Oral BID Aimee Naranjo MD        hydrALAZINE (APRESOLINE) injection 5 mg  5 mg IntraVENous Once PRN Aimee Naranjo MD        hydrALAZINE (APRESOLINE) injection 10 mg  10 mg IntraVENous Once PRN Aimee Naranjo MD        labetalol (NORMODYNE;TRANDATE) injection 20 mg  20 mg IntraVENous Once PRN Aimee Naranjo MD        labetalol (NORMODYNE;TRANDATE) injection 40 mg  40 mg IntraVENous Once PRN

## 2024-05-03 NOTE — FLOWSHEET NOTE
Dr Abarca notified of BPP results 8/8, YING 12.6, current /70 before morning meds. DO reviewing Bps/chart overnight. RN instructed to obtain 2 more Bps and WNL, DC pt home w/instruction to bring home BP cuff in tomorrow at NST appt to confirm accuracy.

## 2024-05-03 NOTE — DISCHARGE SUMMARY
Discharge Summary    Chuyita Reed  :  2000  MRN:  [id number]    ADMIT DATE:  2024  DISCHARGE DATE:  5/3/2024    PRIMARY CARE PHYSICIAN:  No, Pcp    VISIT STATUS: Observation    CODE STATUS:  Full Code    DISCHARGE DIAGNOSES:  Principal Problem:    Pre-eclampsia in third trimester  Resolved Problems:    * No resolved hospital problems. *      HOSPITAL COURSE:  Patient is a 22 yo  female @ 35.1 weeks with known diagnosis of Mild PreEclampsia. She presented with complaints of HA ~12 hours after inpatient stay for Pre-E workup and management of Blood Pressures. She was observed overnight and all surveillance was reassuring. She received Migraine cocktail and started on Antibiotics for confirmed UTI. Bps remained stable on Labetalol and Procardia. She was discharged home with antibiotics only for UTI. She will return on 2024 @ noon for repeat NST, repeat labwork and BP check.     SIGNIFICANT DIAGNOSTIC STUDIES:  BPP 8/8 with YING 12.6    RECOMMENDED NEXT STEPS:   Discharge Home     DISCHARGE MEDICATIONS:         Medication List        CONTINUE taking these medications      cephALEXin 500 MG capsule  Commonly known as: KEFLEX  Take 1 capsule by mouth 4 times daily     labetalol 200 MG tablet  Commonly known as: NORMODYNE  Take 1 tablet by mouth every 12 hours     NIFEdipine 30 MG extended release tablet  Commonly known as: PROCARDIA XL  Take 1 tablet by mouth daily     PRENATAL 1 PO              DIET: ADULT DIET; Regular; Low Sodium (2 gm)    ACTIVITY: PreE precautions  ______________________________________________________________________  COMPLEXITY OF FOLLOW UP:   [] Moderate Complexity: follow up within 7-14 calendar days (31825)   [x] Severe Complexity: follow up within 7 calendar days (75036)    FOLLOW UP TESTING, PENDING RESULTS OR REFERRALS AT TRANSITIONAL CARE VISIT:   []  Yes    [x]  No        DISPOSITION: Home        Follow up with [facility] with next appointment    INSTRUCTIONS TO  MA/SW: Please call patient on day after discharge (must document patient  contacted within 2 business days of discharge).    FOLLOW UP QUESTIONS FOR MA/SW:  1. Did you get medications filled and taking them as instructed from discharge?  2. Are you following your discharge instructions from your hospital stay?  3. Please confirm patient is scheduled for a follow up appointment within the above time frame.    DISCHARGE TIME: > 30 minutes    SIGNED:  Lonny Ambriz MD   5/3/2024, 9:11 AM

## 2024-05-04 ENCOUNTER — APPOINTMENT (OUTPATIENT)
Dept: CT IMAGING | Age: 24
End: 2024-05-04
Payer: OTHER MISCELLANEOUS

## 2024-05-04 ENCOUNTER — APPOINTMENT (OUTPATIENT)
Dept: GENERAL RADIOLOGY | Age: 24
End: 2024-05-04
Payer: OTHER MISCELLANEOUS

## 2024-05-04 VITALS
TEMPERATURE: 98.5 F | HEART RATE: 79 BPM | OXYGEN SATURATION: 99 % | SYSTOLIC BLOOD PRESSURE: 148 MMHG | RESPIRATION RATE: 18 BRPM | BODY MASS INDEX: 42.33 KG/M2 | DIASTOLIC BLOOD PRESSURE: 73 MMHG | HEIGHT: 62 IN | WEIGHT: 230 LBS
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
## 2024-05-04 LAB
ALBUMIN SERPL-MCNC: 3.6 G/DL (ref 3.5–4.6)
ALP SERPL-CCNC: 149 U/L (ref 40–130)
ALT SERPL-CCNC: 17 U/L (ref 0–33)
ANION GAP SERPL CALCULATED.3IONS-SCNC: 13 MEQ/L (ref 9–15)
APTT PPP: 23.9 SEC (ref 24.4–36.8)
AST SERPL-CCNC: 20 U/L (ref 0–35)
BACTERIA URNS QL MICRO: NEGATIVE /HPF
BILIRUB SERPL-MCNC: <0.2 MG/DL (ref 0.2–0.7)
BILIRUB UR QL STRIP: NEGATIVE
BUN SERPL-MCNC: 5 MG/DL (ref 6–20)
CALCIUM SERPL-MCNC: 9 MG/DL (ref 8.5–9.9)
CHLORIDE SERPL-SCNC: 106 MEQ/L (ref 95–107)
CLARITY UR: CLEAR
CO2 SERPL-SCNC: 20 MEQ/L (ref 20–31)
COLOR UR: YELLOW
CREAT SERPL-MCNC: 0.4 MG/DL (ref 0.5–0.9)
EPI CELLS #/AREA URNS AUTO: ABNORMAL /HPF (ref 0–5)
ERYTHROCYTE [DISTWIDTH] IN BLOOD BY AUTOMATED COUNT: 14.4 % (ref 11.5–14.5)
FIBRINOGEN PPP-MCNC: 449 MG/DL (ref 262–562)
GLOBULIN SER CALC-MCNC: 3.4 G/DL (ref 2.3–3.5)
GLUCOSE SERPL-MCNC: 86 MG/DL (ref 70–99)
GLUCOSE UR STRIP-MCNC: NEGATIVE MG/DL
GP B STREP SPEC QL CULT: NORMAL
HCT VFR BLD AUTO: 36.9 % (ref 37–47)
HGB BLD-MCNC: 12.1 G/DL (ref 12–16)
HGB UR QL STRIP: NEGATIVE
HYALINE CASTS #/AREA URNS AUTO: ABNORMAL /HPF (ref 0–5)
INR PPP: 1
KETONES UR STRIP-MCNC: NEGATIVE MG/DL
LDH SERPL-CCNC: 160 U/L (ref 135–214)
LEUKOCYTE ESTERASE UR QL STRIP: ABNORMAL
MCH RBC QN AUTO: 28.2 PG (ref 27–31.3)
MCHC RBC AUTO-ENTMCNC: 32.8 % (ref 33–37)
MCV RBC AUTO: 86 FL (ref 79.4–94.8)
NITRITE UR QL STRIP: NEGATIVE
PH UR STRIP: 7 [PH] (ref 5–9)
PLATELET # BLD AUTO: 197 K/UL (ref 130–400)
POTASSIUM SERPL-SCNC: 3.3 MEQ/L (ref 3.4–4.9)
PROT SERPL-MCNC: 7 G/DL (ref 6.3–8)
PROT UR STRIP-MCNC: NEGATIVE MG/DL
PROTHROMBIN TIME: 13.3 SEC (ref 12.3–14.9)
RBC # BLD AUTO: 4.29 M/UL (ref 4.2–5.4)
RBC #/AREA URNS AUTO: ABNORMAL /HPF (ref 0–5)
SODIUM SERPL-SCNC: 139 MEQ/L (ref 135–144)
SP GR UR STRIP: 1.01 (ref 1–1.03)
URINE REFLEX TO CULTURE: ABNORMAL
UROBILINOGEN UR STRIP-ACNC: 0.2 E.U./DL
WBC # BLD AUTO: 10.1 K/UL (ref 4.8–10.8)
WBC #/AREA URNS AUTO: ABNORMAL /HPF (ref 0–5)

## 2024-05-04 PROCEDURE — 85730 THROMBOPLASTIN TIME PARTIAL: CPT

## 2024-05-04 PROCEDURE — 99285 EMERGENCY DEPT VISIT HI MDM: CPT

## 2024-05-04 PROCEDURE — 80053 COMPREHEN METABOLIC PANEL: CPT

## 2024-05-04 PROCEDURE — 85384 FIBRINOGEN ACTIVITY: CPT

## 2024-05-04 PROCEDURE — 70450 CT HEAD/BRAIN W/O DYE: CPT

## 2024-05-04 PROCEDURE — 6370000000 HC RX 637 (ALT 250 FOR IP): Performed by: STUDENT IN AN ORGANIZED HEALTH CARE EDUCATION/TRAINING PROGRAM

## 2024-05-04 PROCEDURE — G0378 HOSPITAL OBSERVATION PER HR: HCPCS

## 2024-05-04 PROCEDURE — 85027 COMPLETE CBC AUTOMATED: CPT

## 2024-05-04 PROCEDURE — 81001 URINALYSIS AUTO W/SCOPE: CPT

## 2024-05-04 PROCEDURE — 71046 X-RAY EXAM CHEST 2 VIEWS: CPT

## 2024-05-04 PROCEDURE — 85610 PROTHROMBIN TIME: CPT

## 2024-05-04 PROCEDURE — 83615 LACTATE (LD) (LDH) ENZYME: CPT

## 2024-05-04 RX ORDER — ACETAMINOPHEN 500 MG
1000 TABLET ORAL EVERY 8 HOURS SCHEDULED
Status: DISCONTINUED | OUTPATIENT
Start: 2024-05-04 | End: 2024-05-04

## 2024-05-04 RX ORDER — SODIUM CHLORIDE 0.9 % (FLUSH) 0.9 %
5-40 SYRINGE (ML) INJECTION PRN
Status: DISCONTINUED | OUTPATIENT
Start: 2024-05-04 | End: 2024-05-04 | Stop reason: HOSPADM

## 2024-05-04 RX ORDER — SODIUM CHLORIDE 9 MG/ML
INJECTION, SOLUTION INTRAVENOUS PRN
Status: DISCONTINUED | OUTPATIENT
Start: 2024-05-04 | End: 2024-05-04 | Stop reason: HOSPADM

## 2024-05-04 RX ORDER — SODIUM CHLORIDE 0.9 % (FLUSH) 0.9 %
5-40 SYRINGE (ML) INJECTION EVERY 12 HOURS SCHEDULED
Status: DISCONTINUED | OUTPATIENT
Start: 2024-05-04 | End: 2024-05-04 | Stop reason: HOSPADM

## 2024-05-04 RX ORDER — ACETAMINOPHEN 500 MG
1000 TABLET ORAL EVERY 8 HOURS PRN
Status: DISCONTINUED | OUTPATIENT
Start: 2024-05-04 | End: 2024-05-04 | Stop reason: HOSPADM

## 2024-05-04 RX ADMIN — ACETAMINOPHEN 1000 MG: 500 TABLET ORAL at 03:14

## 2024-05-04 ASSESSMENT — ENCOUNTER SYMPTOMS
ABDOMINAL DISTENTION: 0
ANAL BLEEDING: 0
SHORTNESS OF BREATH: 0
VOICE CHANGE: 0
BACK PAIN: 1
EYE DISCHARGE: 0
COUGH: 0
ABDOMINAL PAIN: 1

## 2024-05-04 ASSESSMENT — PAIN DESCRIPTION - LOCATION: LOCATION: HEAD

## 2024-05-04 ASSESSMENT — PAIN SCALES - GENERAL: PAINLEVEL_OUTOF10: 4

## 2024-05-04 ASSESSMENT — PAIN DESCRIPTION - DESCRIPTORS: DESCRIPTORS: DULL
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
## 2024-05-04 NOTE — FLOWSHEET NOTE
Dr. Alicia in to assess and speak to patient. Orders for [facility] labs and NST to be completed during this visit and patient will not need to return today at noon.   Orders to continue assessing EFM until 0600 and assess BP Q2 hours.
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
## 2024-05-04 NOTE — FLOWSHEET NOTE
Reviewed discharge instructions with patient.   Instructed to return  for vaginal bleeding, leaking of fluid, decreased fetal movement, contractions, and pain that is not relieved. Patient verbalizes understanding.
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
## 2024-05-04 NOTE — DISCHARGE INSTRUCTIONS
Return for vaginal bleeding, leaking of fluid, decreased fetal movement, contractions, and pain that is not relieved.

## 2024-05-05 LAB
BACTERIA UR CULT: ABNORMAL
ORGANISM: ABNORMAL
ORGANISM: ABNORMAL

## 2024-05-06 ENCOUNTER — TELEPHONE (OUTPATIENT)
Dept: OBGYN CLINIC | Age: 24
End: 2024-05-06

## 2024-05-06 LAB
BACTERIA UR CULT: ABNORMAL
C TRACH DNA CVX QL NAA+PROBE: NEGATIVE
N GONORRHOEA DNA CERV MUCUS QL NAA+PROBE: NEGATIVE
ORGANISM: ABNORMAL
ORGANISM: ABNORMAL

## 2024-05-06 RX ORDER — NITROFURANTOIN 25; 75 MG/1; MG/1
100 CAPSULE ORAL 2 TIMES DAILY
Qty: 20 CAPSULE | Refills: 0 | Status: SHIPPED | OUTPATIENT
Start: 2024-05-06 | End: 2024-05-16

## 2024-05-08 ENCOUNTER — HOSPITAL ENCOUNTER (OUTPATIENT)
Age: 24
Discharge: HOME | End: 2024-05-08
Attending: OBSTETRICS & GYNECOLOGY | Admitting: OBSTETRICS & GYNECOLOGY
Payer: COMMERCIAL

## 2024-05-08 VITALS
SYSTOLIC BLOOD PRESSURE: 132 MMHG | OXYGEN SATURATION: 97 % | TEMPERATURE: 98.6 F | HEART RATE: 82 BPM | DIASTOLIC BLOOD PRESSURE: 75 MMHG | RESPIRATION RATE: 20 BRPM

## 2024-05-08 PROBLEM — Z3A.35 35 WEEKS GESTATION OF PREGNANCY: Status: ACTIVE | Noted: 2024-05-08

## 2024-05-08 PROBLEM — O23.43 URINARY TRACT INFECTION IN MOTHER DURING THIRD TRIMESTER OF PREGNANCY: Status: ACTIVE | Noted: 2024-05-08

## 2024-05-08 PROBLEM — O26.899 ABDOMINAL CRAMPING, GESTATIONAL: Status: ACTIVE | Noted: 2024-04-23

## 2024-05-08 PROBLEM — R10.9 ABDOMINAL CRAMPING, GESTATIONAL: Status: ACTIVE | Noted: 2024-04-23

## 2024-05-08 PROBLEM — O14.00 ANTEPARTUM MILD PREECLAMPSIA: Status: ACTIVE | Noted: 2024-04-30

## 2024-05-08 LAB
ALBUMIN SERPL-MCNC: 3.6 G/DL (ref 3.5–4.6)
ALP SERPL-CCNC: 164 U/L (ref 40–130)
ALT SERPL-CCNC: 11 U/L (ref 0–33)
AMPHET UR QL SCN: NORMAL
ANION GAP SERPL CALCULATED.3IONS-SCNC: 13 MEQ/L (ref 9–15)
APTT PPP: 24.3 SEC (ref 24.4–36.8)
AST SERPL-CCNC: 16 U/L (ref 0–35)
BARBITURATES UR QL SCN: NORMAL
BASOPHILS # BLD: 0 K/UL (ref 0–0.2)
BASOPHILS NFR BLD: 0.1 %
BENZODIAZ UR QL SCN: NORMAL
BILIRUB SERPL-MCNC: <0.2 MG/DL (ref 0.2–0.7)
BILIRUB UR QL STRIP: NEGATIVE
BUN SERPL-MCNC: 4 MG/DL (ref 6–20)
CALCIUM SERPL-MCNC: 8.9 MG/DL (ref 8.5–9.9)
CANNABINOIDS UR QL SCN: NORMAL
CHLORIDE SERPL-SCNC: 105 MEQ/L (ref 95–107)
CLARITY UR: CLEAR
CO2 SERPL-SCNC: 22 MEQ/L (ref 20–31)
COCAINE UR QL SCN: NORMAL
COLOR UR: YELLOW
CREAT SERPL-MCNC: 0.47 MG/DL (ref 0.5–0.9)
CREAT UR-MCNC: 111.2 MG/DL
DRUG SCREEN COMMENT UR-IMP: NORMAL
EOSINOPHIL # BLD: 0 K/UL (ref 0–0.7)
EOSINOPHIL NFR BLD: 0.4 %
ERYTHROCYTE [DISTWIDTH] IN BLOOD BY AUTOMATED COUNT: 13.7 % (ref 11.5–14.5)
FENTANYL SCREEN, URINE: NORMAL
GLOBULIN SER CALC-MCNC: 3.3 G/DL (ref 2.3–3.5)
GLUCOSE SERPL-MCNC: 76 MG/DL (ref 70–99)
GLUCOSE UR STRIP-MCNC: NEGATIVE MG/DL
HCT VFR BLD AUTO: 37.7 % (ref 37–47)
HGB BLD-MCNC: 12.4 G/DL (ref 12–16)
HGB UR QL STRIP: NEGATIVE
INR PPP: 1
KETONES UR STRIP-MCNC: NEGATIVE MG/DL
LDH SERPL-CCNC: 158 U/L (ref 135–214)
LEUKOCYTE ESTERASE UR QL STRIP: NEGATIVE
LYMPHOCYTES # BLD: 1.8 K/UL (ref 1–4.8)
LYMPHOCYTES NFR BLD: 21.3 %
MCH RBC QN AUTO: 27.5 PG (ref 27–31.3)
MCHC RBC AUTO-ENTMCNC: 32.9 % (ref 33–37)
MCV RBC AUTO: 83.6 FL (ref 79.4–94.8)
METHADONE UR QL SCN: NORMAL
MONOCYTES # BLD: 0.6 K/UL (ref 0.2–0.8)
MONOCYTES NFR BLD: 7.1 %
NEUTROPHILS # BLD: 5.9 K/UL (ref 1.4–6.5)
NEUTS SEG NFR BLD: 70.6 %
NITRITE UR QL STRIP: NEGATIVE
OPIATES UR QL SCN: NORMAL
OXYCODONE UR QL SCN: NORMAL
PCP UR QL SCN: NORMAL
PH UR STRIP: 7 [PH] (ref 5–9)
PLATELET # BLD AUTO: 210 K/UL (ref 130–400)
POTASSIUM SERPL-SCNC: 3.3 MEQ/L (ref 3.4–4.9)
PROPOXYPH UR QL SCN: NORMAL
PROT SERPL-MCNC: 6.9 G/DL (ref 6.3–8)
PROT UR STRIP-MCNC: NEGATIVE MG/DL
PROT UR-MCNC: 20 MG/DL
PROT/CREAT UR-RTO: 0.2 ML/ML
PROT/CREAT UR-RTO: 0.2 ML/ML (ref 0–0.2)
PROTHROMBIN TIME: 13.3 SEC (ref 12.3–14.9)
RBC # BLD AUTO: 4.51 M/UL (ref 4.2–5.4)
SODIUM SERPL-SCNC: 140 MEQ/L (ref 135–144)
SP GR UR STRIP: 1.01 (ref 1–1.03)
URATE SERPL-MCNC: 3.9 MG/DL (ref 2.4–5.7)
URINE REFLEX TO CULTURE: NORMAL
UROBILINOGEN UR STRIP-ACNC: 0.2 E.U./DL
WBC # BLD AUTO: 8.3 K/UL (ref 4.8–10.8)

## 2024-05-08 PROCEDURE — 83615 LACTATE (LD) (LDH) ENZYME: CPT

## 2024-05-08 PROCEDURE — 81003 URINALYSIS AUTO W/O SCOPE: CPT

## 2024-05-08 PROCEDURE — 84156 ASSAY OF PROTEIN URINE: CPT

## 2024-05-08 PROCEDURE — 59025 FETAL NON-STRESS TEST: CPT | Performed by: OBSTETRICS & GYNECOLOGY

## 2024-05-08 PROCEDURE — 85025 COMPLETE CBC W/AUTO DIFF WBC: CPT

## 2024-05-08 PROCEDURE — 84550 ASSAY OF BLOOD/URIC ACID: CPT

## 2024-05-08 PROCEDURE — 99283 EMERGENCY DEPT VISIT LOW MDM: CPT

## 2024-05-08 PROCEDURE — 85610 PROTHROMBIN TIME: CPT

## 2024-05-08 PROCEDURE — 80053 COMPREHEN METABOLIC PANEL: CPT

## 2024-05-08 PROCEDURE — 59025 FETAL NON-STRESS TEST: CPT

## 2024-05-08 PROCEDURE — 80307 DRUG TEST PRSMV CHEM ANLYZR: CPT

## 2024-05-08 PROCEDURE — 85730 THROMBOPLASTIN TIME PARTIAL: CPT

## 2024-05-08 PROCEDURE — 99213 OFFICE O/P EST LOW 20 MIN: CPT | Performed by: OBSTETRICS & GYNECOLOGY

## 2024-05-08 NOTE — FLOWSHEET NOTE
EFM applied and POC discussed. Lab notified for blood work ordered by Dr Ambriz pt is a difficult stick.

## 2024-05-08 NOTE — FLOWSHEET NOTE
Called [facility] and told them she is seeing spots B/P 100 over 95. Took B/P medication for blood pressure and a UTI this am. States medication causes her to be nauseated. UA ordered.

## 2024-05-08 NOTE — ED TRIAGE NOTES
link  https://www.kidney.org/professionals/kdoqi/gfr_calculatorped  Effective Oct 3, 2022  These results are not intended for use in patients  <18 years of age.  eGFR results are calculated without  a race factor using the 2021 CKD-EPI equation.  Careful  clinical correlation is recommended, particularly when  comparing to results calculated using previous equations.  The CKD-EPI equation is less accurate in patients with  extremes of muscle mass, extra-renal metabolism of  creatinine, excessive creatinine ingestion, or following  therapy that affects renal tubular secretion.       Calcium 8.9 mg/dL    Total Protein 6.9 g/dL    Albumin 3.6 g/dL    Total Bilirubin <0.2 mg/dL    Alkaline Phosphatase 164 High  U/L    ALT 11 U/L    AST 16 U/L    Globulin 3.3 g/dL   Lactate Dehydrogenase [[id number]]    Collected: 05/08/24 1347    Updated: 05/08/24 1427    Specimen Type: Blood      U/L   Uric Acid [[id number]]    Collected: 05/08/24 1347    Updated: 05/08/24 1427    Specimen Type: Blood     Uric Acid, Serum 3.9 mg/dL   APTT [[id number]] (Abnormal)    Collected: 05/08/24 1347    Updated: 05/08/24 1419    Specimen Type: Blood     aPTT 24.3 Low  sec    Comment: Effective 11/4/2020:  Heparin Therapeutic Range: 64.0 - 98.0 seconds.      Protime-INR [[id number]]    Collected: 05/08/24 1347    Updated: 05/08/24 1419    Specimen Type: Blood     Protime 13.3 sec    INR 1.0   CBC with Auto Differential [[id number]] (Abnormal)    Collected: 05/08/24 1347    Updated: 05/08/24 1407    Specimen Type: Blood     WBC 8.3 K/uL    RBC 4.51 M/uL    Hemoglobin 12.4 g/dL    Hematocrit 37.7 %    MCV 83.6 fL    MCH 27.5 pg    MCHC 32.9 Low  %    RDW 13.7 %    Platelets 210 K/uL    Neutrophils % 70.6 %    Lymphocytes % 21.3 %    Monocytes % 7.1 %    Eosinophils % 0.4 %    Basophils % 0.1 %    Neutrophils Absolute 5.9 K/uL    Lymphocytes Absolute 1.8 K/uL    Monocytes Absolute 0.6 K/uL    Eosinophils Absolute 0.0 K/uL    Basophils Absolute  0.0 K/uL   PROTEIN TOTAL URINE RANDOM, ON [[id number]]    Collected: 24 1230    Updated: 24 125    Specimen Source: Urine, clean catch     Protein, Ur 20.0 mg/dL    Creatinine, Ur 111.2 mg/dL    Protein/Creat Ratio 0.2 mL/mL    Protein/Creat Ratio 0.2 mL/mL   Urine Drug Screen [[id number]]    Collected: 24 123    Updated: 24    Specimen Type: Urine     Amphetamine Screen, Urine Neg    Barbiturate Screen, Ur Neg    Benzodiazepine Screen, Urine Neg    Cannabinoid Scrn, Ur Neg    Cocaine Metabolite Screen, Urine Neg    Opiate Scrn, Ur Neg    PCP Screen, Urine Neg    Methadone Screen, Urine Neg    Propoxyphene Scrn, Ur Neg    Oxycodone Urine Neg    FENTANYL SCREEN, URINE Neg    Drug Screen Comment: see below    Comment: This method is a screening test to detect only these drug  classes as part of a medical workup.  Confirmatory testing  by another method should be ordered if clinically indicated.      Urinalysis with Reflex to Culture [[id number]]    Collected: 24    Updated: 24    Specimen Source: Urine     Color, UA Yellow    Clarity, UA Clear    Glucose, Ur Negative mg/dL    Bilirubin, Urine Negative    Ketones, Urine Negative mg/dL    Specific Gravity, UA 1.011    Blood, Urine Negative    pH, Urine 7.0    Protein, UA Negative mg/dL    Urobilinogen, Urine 0.2 E.U./dL    Nitrite, Urine Negative    Leukocyte Esterase, Urine Negative    Urine Reflex to Culture Not Indicated       ASSESSMENT & PLAN:      Assessment:   22 yo  female @ 35.6 weeks with EDC 2024 s/b LMP 2023 c/b 13.1 week sono here for elevated Bps at home, nausea with medication, abdominal cramping.- diagnosis of PreE      Plan:    NST reactive. Reassurance given.   No contractions noted on tocometer..   PIH lab within acceptable range. PCR 0.2  Bps controlled on current medication.   Patient is currently taking Keflex for UTI. Recommend taking medication to completion.  Encouraged

## 2024-05-13 ENCOUNTER — HOSPITAL ENCOUNTER (OUTPATIENT)
Age: 24
Discharge: HOME | End: 2024-05-13
Attending: OBSTETRICS & GYNECOLOGY | Admitting: OBSTETRICS & GYNECOLOGY
Payer: COMMERCIAL

## 2024-05-13 VITALS
TEMPERATURE: 98.3 F | DIASTOLIC BLOOD PRESSURE: 62 MMHG | WEIGHT: 268.5 LBS | HEART RATE: 109 BPM | RESPIRATION RATE: 18 BRPM | SYSTOLIC BLOOD PRESSURE: 129 MMHG | BODY MASS INDEX: 49.11 KG/M2 | OXYGEN SATURATION: 98 %

## 2024-05-13 PROBLEM — R10.2 PELVIC PRESSURE IN PREGNANCY, ANTEPARTUM, THIRD TRIMESTER: Status: ACTIVE | Noted: 2024-05-13

## 2024-05-13 PROBLEM — Z3A.36 36 WEEKS GESTATION OF PREGNANCY: Status: ACTIVE | Noted: 2024-05-13

## 2024-05-13 PROBLEM — O14.93 PRE-ECLAMPSIA IN THIRD TRIMESTER: Status: ACTIVE | Noted: 2024-05-13

## 2024-05-13 PROBLEM — O26.893 PELVIC PRESSURE IN PREGNANCY, ANTEPARTUM, THIRD TRIMESTER: Status: ACTIVE | Noted: 2024-05-13

## 2024-05-13 LAB
AMPHET UR QL SCN: NORMAL
BARBITURATES UR QL SCN: NORMAL
BENZODIAZ UR QL SCN: NORMAL
CANNABINOIDS UR QL SCN: NORMAL
COCAINE UR QL SCN: NORMAL
CREAT UR-MCNC: 109 MG/DL
DRUG SCREEN COMMENT UR-IMP: NORMAL
FENTANYL SCREEN, URINE: NORMAL
METHADONE UR QL SCN: NORMAL
OPIATES UR QL SCN: NORMAL
OXYCODONE UR QL SCN: NORMAL
PCP UR QL SCN: NORMAL
PROPOXYPH UR QL SCN: NORMAL
PROT UR-MCNC: 20 MG/DL
PROT/CREAT UR-RTO: 0.2 ML/ML
PROT/CREAT UR-RTO: 0.2 ML/ML (ref 0–0.2)

## 2024-05-13 PROCEDURE — G0379 DIRECT REFER HOSPITAL OBSERV: HCPCS

## 2024-05-13 PROCEDURE — G0378 HOSPITAL OBSERVATION PER HR: HCPCS

## 2024-05-13 PROCEDURE — 84156 ASSAY OF PROTEIN URINE: CPT

## 2024-05-13 PROCEDURE — 59025 FETAL NON-STRESS TEST: CPT

## 2024-05-13 PROCEDURE — 99285 EMERGENCY DEPT VISIT HI MDM: CPT

## 2024-05-13 PROCEDURE — 59025 FETAL NON-STRESS TEST: CPT | Performed by: OBSTETRICS & GYNECOLOGY

## 2024-05-13 PROCEDURE — 80307 DRUG TEST PRSMV CHEM ANLYZR: CPT

## 2024-05-13 PROCEDURE — 99213 OFFICE O/P EST LOW 20 MIN: CPT | Performed by: OBSTETRICS & GYNECOLOGY

## 2024-05-13 NOTE — FLOWSHEET NOTE
Pt C/O feeling like a football is between her legs for the past 3 days. Admits to pressure with urination denies burning. UA obtained. Pt stopped her Keflex, Procardia and Labetalol on her own stated she was told to due to reactions she was having. Denies spots before her eyes, admits to a mild headache.

## 2024-05-13 NOTE — FLOWSHEET NOTE
Dr Ambriz in to see patient after reviewing strip. POC reviewed with pt. OK to discharge home. Pt to keep [facility] appointment this Wednesday.     Subjective   Patient ID: Peng is a 55 year old male.    No chief complaint on file.    MIKE Ortega is evaluated in a follow-up visit.  This was done by video visit.  He has regained some function of his left hand and is now able to move all the fingers but still has some weakness.  He has not had his EMG done.  His back pain however has gradually gotten worse.  He has severe lower back pain with radiation to his bilateral flanks and buttocks.  Occasional pain in the legs.  He does get severe numbness in his legs when he sits for prolonged period of time.  This visit is being performed via video to discuss No chief complaint on file.    Clinician Location: [place] [address] [address] is in [place] and his identity has been established.   He was informed that consent to treat includes permission to submit charges to the applicable insurance on file. Peng was advised regarding the potential risk inherent in video visits, as the assessment may be limited due to what can be seen on the screen which potentially results in an incomplete assessment; as well as either of us may discontinue the video visit if it is felt that the videoconferencing connections are not adequate for his/her situation.   21-30 minutes were spent in this encounter.    Patient's medications, allergies, past medical, surgical, social and family histories were reviewed and updated as appropriate.    Review of Systems    Objective   Physical Exam   He moves all the fingers of his left hand and is able to extend the wrist.  It does seem to be weak and less dexterity than normal however.  Rest of the exam is deferred due to the video nature of the visit.  Assessment   Problem List Items Addressed This Visit     None      Visit Diagnoses     Lumbar radiculopathy    -  Primary    Relevant Orders    MRI LUMBAR SPINE W WO CONTRAST      We will get an updated MRI because of his worsening back pain.  I think he still will need  sacroiliac joint injections to rule this out as a pain generator.  We will follow-up after MRI.  We will skip the follow-up upper extremity EMG since he has had spontaneous improvement in his function.

## 2024-05-13 NOTE — ED TRIAGE NOTES
Department of Obstetrics and Gynecology  Labor and Delivery  Lonny Ambriz MD: OB Triage Note      SUBJECTIVE:  Patient is a 22 yo  female @ 36.4 weeks with EDC 24 s/b LMP 23 c/b 13.1 week sono  She presents with vaginal and urinary pressure since Friday. She was last seen on Friday at which times she presented with nausea after taking Labetalol and Procardia. PIH work up was stable at that time. She reports today that she stopped all meds, including antibiotics. She has slight HA but it is tolerable. She reports +FM denies LOF, VB, VD. She denies any blurry vision, CP, SOB, RUQ pain, or epigastric pain. She presents for reassurance.     OBJECTIVE    ROS:  Gen: Negative  Lungs: Negative  Abdomen: +FM, gravid, +FM  Pelvis: Pressure  Rest of systems reviewed and found to be negative.    Vitals:  Wt 121.8 kg (268 lb 8 oz)   BMI 49.11 kg/m²   Recent Vitals     Most Recent Value 2024  0940 2024  0939 2024  0916   BP: 129/62  as of 2024 129/62 137/84 --   Temp: 98.3 °F (36.8 °C)  as of 2024 -- 98.3 °F (36.8 °C) --   Pulse: 109 Abnormal   as of 2024 109 Abnormal  102 Abnormal  --   Height: 157.5 cm (5' 2\")  as of 5/3/2024 -- -- --   Weight - Scale: 121.8 kg (268 lb 8 oz)  as of 2024 -- -- 121.8 kg (268 lb 8 oz)   Body Mass Index: 49.11 kg/m² Abnormal   157.5 cm (5' 2\")  as of 5/3/2024  121.8 kg (268 lb 8 oz)  as of 2024      Respirations: 18  as of 2024 -- 18 --   SpO2: 98%  as of 2024 -- 98 % --         PE:   Gen: A x O x 3, in NAD  Abdomen: soft, gravid, +FM  Cervix:             Dilation:  Closed         Effacement:  0%         Station:  -2         Consistency:  Medium         Position: posterior    Fetal Position:  Vertex    Membranes: Intact    Fetal heart rate:  Category I (REACTIVE)-duration 30 minutes        Baseline Heart Rate: 140s R       Accelerations:  present       Decelerations: none       Variability:  moderate    Contraction frequency:

## 2024-05-16 NOTE — PRE-PROCEDURE INSTRUCTIONS
Patient was called at 2024 at 1925 to remind of scheduled  section on 24 at 0730. No answer. Message was left to return call.

## 2024-05-17 ENCOUNTER — HOSPITAL ENCOUNTER (INPATIENT)
Age: 24
LOS: 1 days | Discharge: HOME | DRG: 540 | End: 2024-05-18
Attending: OBSTETRICS & GYNECOLOGY | Admitting: OBSTETRICS & GYNECOLOGY
Payer: COMMERCIAL

## 2024-05-17 ENCOUNTER — ANESTHESIA (OUTPATIENT)
Dept: LABOR AND DELIVERY | Age: 24
DRG: 540 | End: 2024-05-17
Payer: COMMERCIAL

## 2024-05-17 ENCOUNTER — ANESTHESIA EVENT (OUTPATIENT)
Dept: LABOR AND DELIVERY | Age: 24
DRG: 540 | End: 2024-05-17
Payer: COMMERCIAL

## 2024-05-17 DIAGNOSIS — Z98.891 S/P C-SECTION: Primary | ICD-10-CM

## 2024-05-17 LAB
ABO + RH BLD: NORMAL
ALBUMIN SERPL-MCNC: 3.6 G/DL (ref 3.5–4.6)
ALP SERPL-CCNC: 175 U/L (ref 40–130)
ALT SERPL-CCNC: 14 U/L (ref 0–33)
AMPHET UR QL SCN: NORMAL
ANION GAP SERPL CALCULATED.3IONS-SCNC: 15 MEQ/L (ref 9–15)
AST SERPL-CCNC: 16 U/L (ref 0–35)
BARBITURATES UR QL SCN: NORMAL
BASOPHILS # BLD: 0 K/UL (ref 0–0.2)
BASOPHILS NFR BLD: 0.1 %
BENZODIAZ UR QL SCN: NORMAL
BILIRUB SERPL-MCNC: <0.2 MG/DL (ref 0.2–0.7)
BILIRUB UR QL STRIP: NEGATIVE
BLD GP AB SCN SERPL QL: NORMAL
BUN SERPL-MCNC: 4 MG/DL (ref 6–20)
CALCIUM SERPL-MCNC: 8.8 MG/DL (ref 8.5–9.9)
CANNABINOIDS UR QL SCN: NORMAL
CHLORIDE SERPL-SCNC: 107 MEQ/L (ref 95–107)
CLARITY UR: CLEAR
CO2 SERPL-SCNC: 19 MEQ/L (ref 20–31)
COCAINE UR QL SCN: NORMAL
COLOR UR: YELLOW
CREAT SERPL-MCNC: 0.46 MG/DL (ref 0.5–0.9)
DRUG SCREEN COMMENT UR-IMP: NORMAL
EOSINOPHIL # BLD: 0.1 K/UL (ref 0–0.7)
EOSINOPHIL NFR BLD: 0.6 %
ERYTHROCYTE [DISTWIDTH] IN BLOOD BY AUTOMATED COUNT: 13.9 % (ref 11.5–14.5)
FENTANYL SCREEN, URINE: NORMAL
GLOBULIN SER CALC-MCNC: 3.3 G/DL (ref 2.3–3.5)
GLUCOSE SERPL-MCNC: 87 MG/DL (ref 70–99)
GLUCOSE UR STRIP-MCNC: NEGATIVE MG/DL
HBV SURFACE AG SERPL QL IA: NORMAL
HCT VFR BLD AUTO: 39.6 % (ref 37–47)
HGB BLD-MCNC: 13 G/DL (ref 12–16)
HGB UR QL STRIP: NEGATIVE
KETONES UR STRIP-MCNC: NEGATIVE MG/DL
LEUKOCYTE ESTERASE UR QL STRIP: NEGATIVE
LYMPHOCYTES # BLD: 2.2 K/UL (ref 1–4.8)
LYMPHOCYTES NFR BLD: 25.8 %
MCH RBC QN AUTO: 27.7 PG (ref 27–31.3)
MCHC RBC AUTO-ENTMCNC: 32.8 % (ref 33–37)
MCV RBC AUTO: 84.3 FL (ref 79.4–94.8)
METHADONE UR QL SCN: NORMAL
MONOCYTES # BLD: 0.6 K/UL (ref 0.2–0.8)
MONOCYTES NFR BLD: 6.9 %
NEUTROPHILS # BLD: 5.6 K/UL (ref 1.4–6.5)
NEUTS SEG NFR BLD: 66.2 %
NITRITE UR QL STRIP: NEGATIVE
OPIATES UR QL SCN: NORMAL
OXYCODONE UR QL SCN: NORMAL
PCP UR QL SCN: NORMAL
PH UR STRIP: 7 [PH] (ref 5–9)
PLATELET # BLD AUTO: 187 K/UL (ref 130–400)
POTASSIUM SERPL-SCNC: 3.4 MEQ/L (ref 3.4–4.9)
PROPOXYPH UR QL SCN: NORMAL
PROT SERPL-MCNC: 6.9 G/DL (ref 6.3–8)
PROT UR STRIP-MCNC: NEGATIVE MG/DL
RBC # BLD AUTO: 4.7 M/UL (ref 4.2–5.4)
RPR SER QL: NORMAL
RUBELLA ANTIBODY IGG: 18.2 IU/ML
SODIUM SERPL-SCNC: 141 MEQ/L (ref 135–144)
SP GR UR STRIP: 1.01 (ref 1–1.03)
UROBILINOGEN UR STRIP-ACNC: 0.2 E.U./DL
WBC # BLD AUTO: 8.4 K/UL (ref 4.8–10.8)

## 2024-05-17 PROCEDURE — A4216 STERILE WATER/SALINE, 10 ML: HCPCS | Performed by: OBSTETRICS & GYNECOLOGY

## 2024-05-17 PROCEDURE — 7100000000 HC PACU RECOVERY - FIRST 15 MIN: Performed by: OBSTETRICS & GYNECOLOGY

## 2024-05-17 PROCEDURE — 3609079900 HC CESAREAN SECTION: Performed by: OBSTETRICS & GYNECOLOGY

## 2024-05-17 PROCEDURE — 81003 URINALYSIS AUTO W/O SCOPE: CPT

## 2024-05-17 PROCEDURE — 6360000002 HC RX W HCPCS: Performed by: OBSTETRICS & GYNECOLOGY

## 2024-05-17 PROCEDURE — 80307 DRUG TEST PRSMV CHEM ANLYZR: CPT

## 2024-05-17 PROCEDURE — 86850 RBC ANTIBODY SCREEN: CPT

## 2024-05-17 PROCEDURE — 1220000000 HC SEMI PRIVATE OB R&B

## 2024-05-17 PROCEDURE — 2720000010 HC SURG SUPPLY STERILE: Performed by: OBSTETRICS & GYNECOLOGY

## 2024-05-17 PROCEDURE — 86900 BLOOD TYPING SEROLOGIC ABO: CPT

## 2024-05-17 PROCEDURE — 3700000001 HC ADD 15 MINUTES (ANESTHESIA): Performed by: OBSTETRICS & GYNECOLOGY

## 2024-05-17 PROCEDURE — 85025 COMPLETE CBC W/AUTO DIFF WBC: CPT

## 2024-05-17 PROCEDURE — 86901 BLOOD TYPING SEROLOGIC RH(D): CPT

## 2024-05-17 PROCEDURE — 86762 RUBELLA ANTIBODY: CPT

## 2024-05-17 PROCEDURE — 6370000000 HC RX 637 (ALT 250 FOR IP): Performed by: OBSTETRICS & GYNECOLOGY

## 2024-05-17 PROCEDURE — 2709999900 HC NON-CHARGEABLE SUPPLY: Performed by: OBSTETRICS & GYNECOLOGY

## 2024-05-17 PROCEDURE — 2500000003 HC RX 250 WO HCPCS: Performed by: OBSTETRICS & GYNECOLOGY

## 2024-05-17 PROCEDURE — 59025 FETAL NON-STRESS TEST: CPT | Performed by: OBSTETRICS & GYNECOLOGY

## 2024-05-17 PROCEDURE — 80053 COMPREHEN METABOLIC PANEL: CPT

## 2024-05-17 PROCEDURE — 6360000002 HC RX W HCPCS: Performed by: NURSE ANESTHETIST, CERTIFIED REGISTERED

## 2024-05-17 PROCEDURE — 2580000003 HC RX 258: Performed by: OBSTETRICS & GYNECOLOGY

## 2024-05-17 PROCEDURE — 7100000001 HC PACU RECOVERY - ADDTL 15 MIN: Performed by: OBSTETRICS & GYNECOLOGY

## 2024-05-17 PROCEDURE — 86592 SYPHILIS TEST NON-TREP QUAL: CPT

## 2024-05-17 PROCEDURE — 59514 CESAREAN DELIVERY ONLY: CPT | Performed by: OBSTETRICS & GYNECOLOGY

## 2024-05-17 PROCEDURE — 87340 HEPATITIS B SURFACE AG IA: CPT

## 2024-05-17 PROCEDURE — 3700000000 HC ANESTHESIA ATTENDED CARE: Performed by: OBSTETRICS & GYNECOLOGY

## 2024-05-17 RX ORDER — BUPIVACAINE HYDROCHLORIDE 7.5 MG/ML
INJECTION, SOLUTION INTRASPINAL
Status: COMPLETED | OUTPATIENT
Start: 2024-05-17 | End: 2024-05-17

## 2024-05-17 RX ORDER — SODIUM CHLORIDE, SODIUM LACTATE, POTASSIUM CHLORIDE, AND CALCIUM CHLORIDE .6; .31; .03; .02 G/100ML; G/100ML; G/100ML; G/100ML
1000 INJECTION, SOLUTION INTRAVENOUS ONCE
Status: DISCONTINUED | OUTPATIENT
Start: 2024-05-17 | End: 2024-05-17

## 2024-05-17 RX ORDER — ONDANSETRON 2 MG/ML
4 INJECTION INTRAMUSCULAR; INTRAVENOUS EVERY 6 HOURS PRN
Status: DISCONTINUED | OUTPATIENT
Start: 2024-05-17 | End: 2024-05-17

## 2024-05-17 RX ORDER — METHYLERGONOVINE MALEATE 0.2 MG/ML
INJECTION INTRAVENOUS
Status: DISCONTINUED
Start: 2024-05-17 | End: 2024-05-17 | Stop reason: WASHOUT

## 2024-05-17 RX ORDER — DOCUSATE SODIUM 100 MG/1
100 CAPSULE, LIQUID FILLED ORAL 2 TIMES DAILY
Status: DISCONTINUED | OUTPATIENT
Start: 2024-05-17 | End: 2024-05-18 | Stop reason: HOSPADM

## 2024-05-17 RX ORDER — MISOPROSTOL 200 UG/1
TABLET ORAL
Status: DISCONTINUED
Start: 2024-05-17 | End: 2024-05-17 | Stop reason: WASHOUT

## 2024-05-17 RX ORDER — KETOROLAC TROMETHAMINE 30 MG/ML
INJECTION, SOLUTION INTRAMUSCULAR; INTRAVENOUS PRN
Status: DISCONTINUED | OUTPATIENT
Start: 2024-05-17 | End: 2024-05-17 | Stop reason: SDUPTHER

## 2024-05-17 RX ORDER — SODIUM CHLORIDE 9 MG/ML
INJECTION, SOLUTION INTRAVENOUS
Status: DISCONTINUED
Start: 2024-05-17 | End: 2024-05-17 | Stop reason: WASHOUT

## 2024-05-17 RX ORDER — MODIFIED LANOLIN
OINTMENT (GRAM) TOPICAL
Status: DISCONTINUED | OUTPATIENT
Start: 2024-05-17 | End: 2024-05-18 | Stop reason: HOSPADM

## 2024-05-17 RX ORDER — MORPHINE SULFATE 1 MG/ML
INJECTION, SOLUTION EPIDURAL; INTRATHECAL; INTRAVENOUS
Status: COMPLETED | OUTPATIENT
Start: 2024-05-17 | End: 2024-05-17

## 2024-05-17 RX ORDER — DEXAMETHASONE SODIUM PHOSPHATE 10 MG/ML
INJECTION, SOLUTION INTRAMUSCULAR; INTRAVENOUS PRN
Status: DISCONTINUED | OUTPATIENT
Start: 2024-05-17 | End: 2024-05-17 | Stop reason: SDUPTHER

## 2024-05-17 RX ORDER — KETOROLAC TROMETHAMINE 30 MG/ML
30 INJECTION, SOLUTION INTRAMUSCULAR; INTRAVENOUS EVERY 6 HOURS
Status: DISPENSED | OUTPATIENT
Start: 2024-05-17 | End: 2024-05-18

## 2024-05-17 RX ORDER — SODIUM CHLORIDE 9 MG/ML
INJECTION, SOLUTION INTRAVENOUS PRN
Status: DISCONTINUED | OUTPATIENT
Start: 2024-05-17 | End: 2024-05-18 | Stop reason: HOSPADM

## 2024-05-17 RX ORDER — LABETALOL 200 MG/1
200 TABLET, FILM COATED ORAL EVERY 12 HOURS SCHEDULED
Status: DISCONTINUED | OUTPATIENT
Start: 2024-05-17 | End: 2024-05-18 | Stop reason: HOSPADM

## 2024-05-17 RX ORDER — SODIUM CHLORIDE 9 MG/ML
INJECTION, SOLUTION INTRAVENOUS PRN
Status: DISCONTINUED | OUTPATIENT
Start: 2024-05-17 | End: 2024-05-17

## 2024-05-17 RX ORDER — SCOLOPAMINE TRANSDERMAL SYSTEM 1 MG/1
1 PATCH, EXTENDED RELEASE TRANSDERMAL
Status: DISCONTINUED | OUTPATIENT
Start: 2024-05-17 | End: 2024-05-17

## 2024-05-17 RX ORDER — HYDROMORPHONE HYDROCHLORIDE 1 MG/ML
0.25 INJECTION, SOLUTION INTRAMUSCULAR; INTRAVENOUS; SUBCUTANEOUS
Status: DISCONTINUED | OUTPATIENT
Start: 2024-05-17 | End: 2024-05-18 | Stop reason: HOSPADM

## 2024-05-17 RX ORDER — TRANEXAMIC ACID 100 MG/ML
INJECTION, SOLUTION INTRAVENOUS
Status: DISCONTINUED
Start: 2024-05-17 | End: 2024-05-17 | Stop reason: WASHOUT

## 2024-05-17 RX ORDER — SODIUM CHLORIDE 0.9 % (FLUSH) 0.9 %
10 SYRINGE (ML) INJECTION EVERY 12 HOURS SCHEDULED
Status: DISCONTINUED | OUTPATIENT
Start: 2024-05-17 | End: 2024-05-17

## 2024-05-17 RX ORDER — SODIUM CHLORIDE 0.9 % (FLUSH) 0.9 %
5-40 SYRINGE (ML) INJECTION EVERY 12 HOURS SCHEDULED
Status: DISCONTINUED | OUTPATIENT
Start: 2024-05-17 | End: 2024-05-18 | Stop reason: HOSPADM

## 2024-05-17 RX ORDER — NIFEDIPINE 30 MG/1
30 TABLET, EXTENDED RELEASE ORAL DAILY
Status: DISCONTINUED | OUTPATIENT
Start: 2024-05-17 | End: 2024-05-18 | Stop reason: HOSPADM

## 2024-05-17 RX ORDER — OXYCODONE HYDROCHLORIDE 5 MG/1
5 TABLET ORAL EVERY 4 HOURS PRN
Status: DISCONTINUED | OUTPATIENT
Start: 2024-05-17 | End: 2024-05-18 | Stop reason: HOSPADM

## 2024-05-17 RX ORDER — IBUPROFEN 800 MG/1
800 TABLET ORAL EVERY 8 HOURS
Status: DISCONTINUED | OUTPATIENT
Start: 2024-05-18 | End: 2024-05-18 | Stop reason: HOSPADM

## 2024-05-17 RX ORDER — NALBUPHINE HYDROCHLORIDE 20 MG/ML
5 INJECTION, SOLUTION INTRAMUSCULAR; INTRAVENOUS; SUBCUTANEOUS
Status: DISCONTINUED | OUTPATIENT
Start: 2024-05-17 | End: 2024-05-18 | Stop reason: HOSPADM

## 2024-05-17 RX ORDER — SODIUM CHLORIDE, SODIUM LACTATE, POTASSIUM CHLORIDE, CALCIUM CHLORIDE 600; 310; 30; 20 MG/100ML; MG/100ML; MG/100ML; MG/100ML
INJECTION, SOLUTION INTRAVENOUS CONTINUOUS
Status: DISCONTINUED | OUTPATIENT
Start: 2024-05-17 | End: 2024-05-18 | Stop reason: HOSPADM

## 2024-05-17 RX ORDER — ONDANSETRON 2 MG/ML
INJECTION INTRAMUSCULAR; INTRAVENOUS PRN
Status: DISCONTINUED | OUTPATIENT
Start: 2024-05-17 | End: 2024-05-17 | Stop reason: SDUPTHER

## 2024-05-17 RX ORDER — SODIUM CHLORIDE 0.9 % (FLUSH) 0.9 %
5-40 SYRINGE (ML) INJECTION PRN
Status: DISCONTINUED | OUTPATIENT
Start: 2024-05-17 | End: 2024-05-18 | Stop reason: HOSPADM

## 2024-05-17 RX ORDER — CARBOPROST TROMETHAMINE 250 UG/ML
INJECTION, SOLUTION INTRAMUSCULAR
Status: DISCONTINUED
Start: 2024-05-17 | End: 2024-05-17 | Stop reason: WASHOUT

## 2024-05-17 RX ORDER — SODIUM CHLORIDE, SODIUM LACTATE, POTASSIUM CHLORIDE, CALCIUM CHLORIDE 600; 310; 30; 20 MG/100ML; MG/100ML; MG/100ML; MG/100ML
INJECTION, SOLUTION INTRAVENOUS CONTINUOUS
Status: DISCONTINUED | OUTPATIENT
Start: 2024-05-17 | End: 2024-05-17

## 2024-05-17 RX ORDER — SODIUM CHLORIDE 0.9 % (FLUSH) 0.9 %
10 SYRINGE (ML) INJECTION PRN
Status: DISCONTINUED | OUTPATIENT
Start: 2024-05-17 | End: 2024-05-17

## 2024-05-17 RX ORDER — HYDROMORPHONE HYDROCHLORIDE 1 MG/ML
0.5 INJECTION, SOLUTION INTRAMUSCULAR; INTRAVENOUS; SUBCUTANEOUS
Status: DISCONTINUED | OUTPATIENT
Start: 2024-05-17 | End: 2024-05-18 | Stop reason: HOSPADM

## 2024-05-17 RX ORDER — ONDANSETRON 2 MG/ML
4 INJECTION INTRAMUSCULAR; INTRAVENOUS EVERY 6 HOURS PRN
Status: DISCONTINUED | OUTPATIENT
Start: 2024-05-17 | End: 2024-05-18 | Stop reason: HOSPADM

## 2024-05-17 RX ORDER — BISACODYL 10 MG
10 SUPPOSITORY, RECTAL RECTAL DAILY PRN
Status: DISCONTINUED | OUTPATIENT
Start: 2024-05-17 | End: 2024-05-18 | Stop reason: HOSPADM

## 2024-05-17 RX ORDER — DIPHENHYDRAMINE HYDROCHLORIDE 50 MG/ML
INJECTION INTRAMUSCULAR; INTRAVENOUS PRN
Status: DISCONTINUED | OUTPATIENT
Start: 2024-05-17 | End: 2024-05-17 | Stop reason: SDUPTHER

## 2024-05-17 RX ORDER — ONDANSETRON 4 MG/1
4 TABLET, ORALLY DISINTEGRATING ORAL EVERY 8 HOURS PRN
Status: DISCONTINUED | OUTPATIENT
Start: 2024-05-17 | End: 2024-05-18 | Stop reason: HOSPADM

## 2024-05-17 RX ORDER — OXYTOCIN 10 [USP'U]/ML
INJECTION, SOLUTION INTRAMUSCULAR; INTRAVENOUS
Status: DISCONTINUED
Start: 2024-05-17 | End: 2024-05-17 | Stop reason: WASHOUT

## 2024-05-17 RX ADMIN — SODIUM CHLORIDE, POTASSIUM CHLORIDE, SODIUM LACTATE AND CALCIUM CHLORIDE: 600; 310; 30; 20 INJECTION, SOLUTION INTRAVENOUS at 08:24

## 2024-05-17 RX ADMIN — KETOROLAC TROMETHAMINE 30 MG: 30 INJECTION INTRAMUSCULAR; INTRAVENOUS at 20:41

## 2024-05-17 RX ADMIN — LABETALOL HYDROCHLORIDE 200 MG: 200 TABLET, FILM COATED ORAL at 20:41

## 2024-05-17 RX ADMIN — ONDANSETRON 4 MG: 2 INJECTION INTRAMUSCULAR; INTRAVENOUS at 08:13

## 2024-05-17 RX ADMIN — DIPHENHYDRAMINE HYDROCHLORIDE 12.5 MG: 50 INJECTION INTRAMUSCULAR; INTRAVENOUS at 08:13

## 2024-05-17 RX ADMIN — DOCUSATE SODIUM 100 MG: 100 CAPSULE, LIQUID FILLED ORAL at 20:41

## 2024-05-17 RX ADMIN — DEXAMETHASONE SODIUM PHOSPHATE 10 MG: 10 INJECTION, SOLUTION INTRAMUSCULAR; INTRAVENOUS at 08:13

## 2024-05-17 RX ADMIN — SODIUM CHLORIDE, POTASSIUM CHLORIDE, SODIUM LACTATE AND CALCIUM CHLORIDE: 600; 310; 30; 20 INJECTION, SOLUTION INTRAVENOUS at 06:06

## 2024-05-17 RX ADMIN — Medication 907 ML/HR: at 08:10

## 2024-05-17 RX ADMIN — KETOROLAC TROMETHAMINE 30 MG: 30 INJECTION INTRAMUSCULAR; INTRAVENOUS at 08:13

## 2024-05-17 RX ADMIN — LABETALOL HYDROCHLORIDE 200 MG: 200 TABLET, FILM COATED ORAL at 10:09

## 2024-05-17 RX ADMIN — BUPIVACAINE HYDROCHLORIDE IN DEXTROSE 12 MG: 7.5 INJECTION, SOLUTION SUBARACHNOID at 07:49

## 2024-05-17 RX ADMIN — Medication 5 MG: at 10:07

## 2024-05-17 RX ADMIN — SODIUM CHLORIDE, POTASSIUM CHLORIDE, SODIUM LACTATE AND CALCIUM CHLORIDE: 600; 310; 30; 20 INJECTION, SOLUTION INTRAVENOUS at 07:10

## 2024-05-17 RX ADMIN — CEFAZOLIN 3000 MG: 2 INJECTION, POWDER, FOR SOLUTION INTRAMUSCULAR; INTRAVENOUS at 08:02

## 2024-05-17 RX ADMIN — DOCUSATE SODIUM 100 MG: 100 CAPSULE, LIQUID FILLED ORAL at 10:09

## 2024-05-17 RX ADMIN — KETOROLAC TROMETHAMINE 30 MG: 30 INJECTION INTRAMUSCULAR; INTRAVENOUS at 14:44

## 2024-05-17 RX ADMIN — FAMOTIDINE 20 MG: 10 INJECTION, SOLUTION INTRAVENOUS at 06:13

## 2024-05-17 RX ADMIN — MORPHINE SULFATE 0.2 MG: 1 INJECTION EPIDURAL; INTRATHECAL; INTRAVENOUS at 07:49

## 2024-05-17 RX ADMIN — NIFEDIPINE 30 MG: 30 TABLET, EXTENDED RELEASE ORAL at 10:09

## 2024-05-17 ASSESSMENT — PAIN SCALES - GENERAL
PAINLEVEL_OUTOF10: 5
PAINLEVEL_OUTOF10: 7

## 2024-05-17 ASSESSMENT — PAIN DESCRIPTION - LOCATION
LOCATION: ABDOMEN
LOCATION: ABDOMEN

## 2024-05-17 ASSESSMENT — PAIN DESCRIPTION - DESCRIPTORS: DESCRIPTORS: SORE

## 2024-05-17 NOTE — ANESTHESIA PROCEDURE NOTES
Spinal Block    End time: 5/17/2024 7:58 AM  Reason for block: primary anesthetic  Staffing  Performed: resident/CRNA   Resident/CRNA: Vicente Shearer APRN - CRNA  Performed by: Vicente Shearer APRN - CRNA  Authorized by: Vicente Shearer APRN - CRNA    Spinal Block  Patient position: sitting  Prep: Betadine and site prepped and draped  Patient monitoring: continuous pulse ox and frequent blood pressure checks  Approach: midline  Location: L3/L4  Provider prep: mask and sterile gloves  Local infiltration: lidocaine  Needle  Needle type: Pencan   Needle gauge: 24 G  Needle length: 3.5 in  Assessment  Sensory level: T6  Swirl obtained: Yes  CSF: clear  Attempts: 1  Hemodynamics: stable  Preanesthetic Checklist  Completed: patient identified, IV checked, site marked, risks and benefits discussed, surgical/procedural consents, equipment checked, pre-op evaluation, timeout performed, anesthesia consent given, oxygen available, monitors applied/VS acknowledged, fire risk safety assessment completed and verbalized and blood product R/B/A discussed and consented

## 2024-05-17 NOTE — OP NOTE
Department of Obstetrics and Gynecology   Section Note      Indications: Previous  section for repeat    Pre-operative Diagnosis: IUP at 37 weeks.  Previous  section for repeat.  Gestational hypertension noncompliance with antihypertensive medication    Post-operative Diagnosis: Same with the delivery of a viable female  Procedure: Repeat low segment transverse  section  Surgeon: Trung Abarca DO    Assistants: The use of a first assistant was necessary because there was no qualified resident surgeon available.  Dr. Ambriz assisted in the proper positioning, prepping, and draping of the patient, intraoperative retraction and suctioning for visualization, passing sutures and suture management.    Anesthesia: Spinal    HPI: This is a 23-year-old  2 para 1-0-0-1 female had a previous  section for nonreassuring fetal heart rate.  Patient has been noncompliant with her prenatal care.  She has had several visits to triage.  She was treated with antihypertensives due to gestational hypertension and she was noncompliant with both her prenatal visits as well as taking the medication that we had prescribed for her    Procedure Details  The risks, benefits, complications, treatment options, and expected outcomes were discussed with the patient.  The patient concurred with the proposed plan, giving informed consent.  The site of surgery properly noted/marked. The patient was taken to Operating Room  identified as Chuyita Reed and the procedure verified as  Delivery. A Time Out was held and the above information confirmed.    After induction of anesthesia, the patient was draped and prepped in the usual sterile manner. A transverse suprapubic skin incision was made then the abdomen was opened with a Pfannenstiel incision.  Fascial incision was made and extended transversely. The fascia was  from the underlying rectus tissue superiorly and inferiorly.

## 2024-05-17 NOTE — LACTATION NOTE
This note was copied from a baby's chart.  -mom reports first feed went well  -intent to combo feed, has prior BF experience  -provided with written breastfeeding education materials and reviewed  -offered support and encouraged to call for feeds

## 2024-05-17 NOTE — ANESTHESIA PRE PROCEDURE
Department of Anesthesiology  Preprocedure Note       Name:  Chuyita Reed   Age:  23 y.o.  :  2000                                          MRN:  [id number]         Date:  2024      Surgeon: Surgeon(s):  Trung Abarca DO    Procedure: Procedure(s):   SECTION    Medications prior to admission:   Prior to Admission medications    Medication Sig Start Date End Date Taking? Authorizing Provider   labetalol (NORMODYNE) 200 MG tablet Take 1 tablet by mouth every 12 hours  Patient not taking: Reported on 2024   Trung Abarca DO   cephALEXin (KEFLEX) 500 MG capsule Take 1 capsule by mouth 4 times daily  Patient not taking: Reported on 2024   Trung Abarca DO   NIFEdipine (PROCARDIA XL) 30 MG extended release tablet Take 1 tablet by mouth daily  Patient not taking: Reported on 2024   Trung Abarca DO   Prenatal MV-Min-Fe Fum-FA-DHA (PRENATAL 1 PO) Take 1 tablet by mouth daily    Provider, MD Doretha       Current medications:    Current Facility-Administered Medications   Medication Dose Route Frequency Provider Last Rate Last Admin    lactated ringers IV soln infusion   IntraVENous Continuous Trung Abarca  mL/hr at 24 0710 New Bag at 24 0710    lactated ringers bolus 1,000 mL  1,000 mL IntraVENous Once Trung Abarca DO        sodium chloride flush 0.9 % injection 10 mL  10 mL IntraVENous 2 times per day Trung Abarca DO        sodium chloride flush 0.9 % injection 10 mL  10 mL IntraVENous PRN Trung Abarca DO        0.9 % sodium chloride infusion   IntraVENous PRN Trung Abarca DO        ceFAZolin (ANCEF) 2,000 mg in sodium chloride 0.9 % 100 mL IVPB (mini-bag)  2,000 mg IntraVENous Once Trung Abarca DO        oxytocin (PITOCIN) 30 units in 500 mL infusion  87.3 glenys-units/min IntraVENous Continuous PRN Trung Abarca DO        And    oxytocin (PITOCIN) 10 unit bolus from the bag

## 2024-05-17 NOTE — H&P
Department of Obstetrics and Gynecology   History & Physical    Pt Name: Chuyita Reed  MRN: [id number] Acct #: [id number]  YOB: 2000  Estimated Date of Delivery: 24      HPI: The patient is a 23 y.o.  female  who presents for repeat  section at 37 weeks and 1 day.  She had a  for fetal intolerance in 2021.  She is not interested in trial of labor.  She is 37 weeks and has gestational hypertension.  She is not compliant with her blood pressure medications.  Her blood pressure is elevated today upon arrival.    Allergies:    Allergies as of 2024 - Fully Reviewed 2024   Allergen Reaction Noted    Latex Itching 2024    Amoxicillin Itching 2024    Penicillins Itching 2024       Medications:    Current Facility-Administered Medications:     lactated ringers IV soln infusion, , IntraVENous, Continuous, Trung Abarca DO, Last Rate: 125 mL/hr at 24 0710, New Bag at 24 0710    lactated ringers bolus 1,000 mL, 1,000 mL, IntraVENous, Once, Trung Abarca DO    sodium chloride flush 0.9 % injection 10 mL, 10 mL, IntraVENous, 2 times per day, Trung Abarca DO    sodium chloride flush 0.9 % injection 10 mL, 10 mL, IntraVENous, PRN, Trung Abarca,     0.9 % sodium chloride infusion, , IntraVENous, PRN, Trung Abarca,     ceFAZolin (ANCEF) 2,000 mg in sodium chloride 0.9 % 100 mL IVPB (mini-bag), 2,000 mg, IntraVENous, Once, Trung Abarca DO    oxytocin (PITOCIN) 30 units in 500 mL infusion, 87.3 glenys-units/min, IntraVENous, Continuous PRN **AND** oxytocin (PITOCIN) 10 unit bolus from the bag, 10 Units, IntraVENous, PRN, Trung Abarca,     ondansetron (ZOFRAN) injection 4 mg, 4 mg, IntraVENous, Q6H PRN, Trung Abarca DO    scopolamine (TRANSDERM-SCOP) transdermal patch 1 patch, 1 patch, TransDERmal, Q72H, Trung Abarca DO, 1 patch at 24 0613    famotidine (PEPCID) 20 mg in sodium

## 2024-05-17 NOTE — ANESTHESIA POSTPROCEDURE EVALUATION
Department of Anesthesiology  Postprocedure Note    Patient: Chuyita Reed  MRN: [id number]  YOB: 2000  Date of evaluation: 2024    Procedure Summary       Date: 24 Room / Location: [address]    Anesthesia Start: 749 Anesthesia Stop: 835    Procedure:  SECTION (Abdomen) Diagnosis:       Admitted to labor and delivery      (Admitted to labor and delivery [Z78.9])    Surgeons: Trung Abarca DO Responsible Provider: Vicente Shearer APRN - CRNA    Anesthesia Type: spinal ASA Status: 3            Anesthesia Type: No value filed.    Sylwia Phase I:      Sylwia Phase II:      Anesthesia Post Evaluation    Patient location during evaluation: bedside  Patient participation: complete - patient participated  Level of consciousness: awake and awake and alert  Pain score: 0  Airway patency: patent  Nausea & Vomiting: no nausea and no vomiting  Cardiovascular status: blood pressure returned to baseline and hemodynamically stable  Respiratory status: acceptable  Hydration status: euvolemic  Comments: Spinal to T-10  Pain management: adequate        No notable events documented.

## 2024-05-18 VITALS
TEMPERATURE: 98.1 F | SYSTOLIC BLOOD PRESSURE: 130 MMHG | DIASTOLIC BLOOD PRESSURE: 80 MMHG | OXYGEN SATURATION: 95 % | RESPIRATION RATE: 18 BRPM | HEART RATE: 77 BPM

## 2024-05-18 LAB
ERYTHROCYTE [DISTWIDTH] IN BLOOD BY AUTOMATED COUNT: 14 % (ref 11.5–14.5)
HCT VFR BLD AUTO: 31 % (ref 37–47)
HGB BLD-MCNC: 10.2 G/DL (ref 12–16)
MCH RBC QN AUTO: 27.9 PG (ref 27–31.3)
MCHC RBC AUTO-ENTMCNC: 32.9 % (ref 33–37)
MCV RBC AUTO: 84.9 FL (ref 79.4–94.8)
PLATELET # BLD AUTO: 169 K/UL (ref 130–400)
RBC # BLD AUTO: 3.65 M/UL (ref 4.2–5.4)
WBC # BLD AUTO: 11.7 K/UL (ref 4.8–10.8)

## 2024-05-18 PROCEDURE — 6370000000 HC RX 637 (ALT 250 FOR IP): Performed by: OBSTETRICS & GYNECOLOGY

## 2024-05-18 PROCEDURE — 6360000002 HC RX W HCPCS: Performed by: OBSTETRICS & GYNECOLOGY

## 2024-05-18 PROCEDURE — 85027 COMPLETE CBC AUTOMATED: CPT

## 2024-05-18 RX ORDER — LABETALOL 200 MG/1
200 TABLET, FILM COATED ORAL 2 TIMES DAILY
Qty: 60 TABLET | Refills: 3 | Status: SHIPPED | OUTPATIENT
Start: 2024-05-18

## 2024-05-18 RX ORDER — PSEUDOEPHEDRINE HCL 30 MG
100 TABLET ORAL 2 TIMES DAILY
Qty: 30 CAPSULE | Refills: 1 | Status: SHIPPED | OUTPATIENT
Start: 2024-05-18

## 2024-05-18 RX ORDER — OXYCODONE HYDROCHLORIDE 5 MG/1
5 TABLET ORAL EVERY 8 HOURS PRN
Qty: 8 TABLET | Refills: 0 | Status: SHIPPED | OUTPATIENT
Start: 2024-05-18 | End: 2024-05-21

## 2024-05-18 RX ORDER — ACETAMINOPHEN 325 MG/1
650 TABLET ORAL EVERY 6 HOURS PRN
Qty: 120 TABLET | Refills: 3 | Status: SHIPPED | OUTPATIENT
Start: 2024-05-18

## 2024-05-18 RX ORDER — NIFEDIPINE 30 MG/1
30 TABLET, EXTENDED RELEASE ORAL DAILY
Qty: 30 TABLET | Refills: 3 | Status: SHIPPED | OUTPATIENT
Start: 2024-05-19 | End: 2024-05-18 | Stop reason: HOSPADM

## 2024-05-18 RX ORDER — IBUPROFEN 800 MG/1
800 TABLET ORAL EVERY 8 HOURS
Qty: 120 TABLET | Refills: 3 | Status: SHIPPED | OUTPATIENT
Start: 2024-05-18

## 2024-05-18 RX ORDER — NIFEDIPINE 60 MG/1
60 TABLET, EXTENDED RELEASE ORAL DAILY
Qty: 30 TABLET | Refills: 3 | Status: SHIPPED | OUTPATIENT
Start: 2024-05-18

## 2024-05-18 RX ADMIN — IBUPROFEN 800 MG: 800 TABLET, FILM COATED ORAL at 11:40

## 2024-05-18 RX ADMIN — DOCUSATE SODIUM 100 MG: 100 CAPSULE, LIQUID FILLED ORAL at 11:41

## 2024-05-18 RX ADMIN — LABETALOL HYDROCHLORIDE 200 MG: 200 TABLET, FILM COATED ORAL at 11:41

## 2024-05-18 RX ADMIN — NIFEDIPINE 30 MG: 30 TABLET, EXTENDED RELEASE ORAL at 11:41

## 2024-05-18 RX ADMIN — KETOROLAC TROMETHAMINE 30 MG: 30 INJECTION INTRAMUSCULAR; INTRAVENOUS at 03:43

## 2024-05-18 ASSESSMENT — PAIN SCALES - GENERAL
PAINLEVEL_OUTOF10: 5
PAINLEVEL_OUTOF10: 7

## 2024-05-18 NOTE — PROGRESS NOTES
C Section Postpartum Progress Note    Subjective:      23 y.o.   @ 37w1d    Postpartum Day 1:  Delivery for Repeat    The patient feels well. The patient denies emotional concerns. Pain is well controlled with current medications. Urinary output is adequate. The patient is ambulating well. The patient is tolerating a normal diet. Flatus denies been passed.    Objective:      Patient Vitals for the past 8 hrs:   BP Temp Temp src Pulse Resp   24 1140 130/80 98.1 °F (36.7 °C) -- 77 --   24 0359 133/71 98 °F (36.7 °C) Oral 80 18     General:    alert, appears stated age, and cooperative   Bowel Sounds:  active   Lochia:  appropriate   Uterine Fundus:   firm   Incision:  healing well, no significant drainage, no dehiscence, no significant erythema   DVT Evaluation:  No evidence of DVT seen on physical exam.         Assessment:     Status post  section. Doing well postoperatively.     Plan:   1.Routine post op care    Discharge home with standard precautions and return to clinic in 4-6 weeks.    Cosme Soares DO, MBA, FACOOG, FACOG  May 18, 2024 11:58 AM

## 2024-05-18 NOTE — DISCHARGE SUMMARY
Obstetric Discharge Summary    Reasons for Admission on 2024  5:15 AM   Section (Repeat)    Prenatal Procedures  None    Intrapartum Procedures   Delivery    Postpartum Procedures  None    [place] Data  Information for the patient's :  Morelia Dunn Chuyita [[id number]]   female   Birth Weight: 3.172 kg (6 lb 15.9 oz)   Discharge With Mother  Complications: No    Discharge Diagnosis  Patient Active Problem List    Diagnosis Date Noted    Previous  section 2024    36 weeks gestation of pregnancy 2024    Pelvic pressure in pregnancy, antepartum, third trimester 2024    Pre-eclampsia in third trimester 2024    35 weeks gestation of pregnancy 2024    Urinary tract infection in mother during third trimester of pregnancy 2024    Admitted to labor and delivery 2024    Antepartum mild preeclampsia 2024    33 weeks gestation of pregnancy 2024    No leakage of amniotic fluid into vagina 2024    Gestational hypertension, third trimester 2024    History of  section complicating pregnancy 2024    27 weeks gestation of pregnancy 2024    Pelvic pressure in pregnancy, antepartum, second trimester 2024    Nonspecific chest pain 2024       Discharge Information  Current Discharge Medication List        START taking these medications    Details   oxyCODONE (ROXICODONE) 5 MG immediate release tablet Take 1 tablet by mouth every 8 hours as needed for Pain for up to 3 days. Max Daily Amount: 15 mg  Qty: 8 tablet, Refills: 0    Comments: Reduce doses taken as pain becomes manageable  Associated Diagnoses: S/P       docusate sodium (COLACE, DULCOLAX) 100 MG CAPS Take 100 mg by mouth 2 times daily  Qty: 30 capsule, Refills: 1      ibuprofen (ADVIL;MOTRIN) 800 MG tablet Take 1 tablet by mouth in the morning and 1 tablet at noon and 1 tablet in the evening.  Qty: 120 tablet, Refills: 3       acetaminophen (AMINOFEN) 325 MG tablet Take 2 tablets by mouth every 6 hours as needed for Pain  Qty: 120 tablet, Refills: 3           CONTINUE these medications which have CHANGED    Details   labetalol (NORMODYNE) 200 MG tablet Take 1 tablet by mouth 2 times daily  Qty: 60 tablet, Refills: 3      NIFEdipine (PROCARDIA XL) 30 MG extended release tablet Take 1 tablet by mouth daily  Qty: 30 tablet, Refills: 3           CONTINUE these medications which have NOT CHANGED    Details   Prenatal MV-Min-Fe Fum-FA-DHA (PRENATAL 1 PO) Take 1 tablet by mouth daily           STOP taking these medications       cephALEXin (KEFLEX) 500 MG capsule Comments:   Reason for Stopping:                 Discharge to: Home    Condition on discharge: Stable    Discharge Date: May 18, 2024      Cosme Soares DO, MBA, FACOOG, FACOG  May 18, 2024 11:57 AM

## 2024-05-18 NOTE — FLOWSHEET NOTE
RN bedside pt requesting breast pump. This RN brought all of the supplies bedside and set up pump, pt educated on use of pump and instructed to put baby to breast and pump after. Pt verbalizes understanding and has no further questions at this time.

## 2024-05-18 NOTE — PLAN OF CARE
Problem: Vaginal Birth or  Section  Goal: Fetal and maternal status remain reassuring during the birth process  Description:  Birth OB-Pregnancy care plan goal which identifies if the fetal and maternal status remain reassuring during the birth process  2024 115 by Tatyana Perdue RN  Outcome: Completed  2024 07 by Vishal Dodd RN  Outcome: Progressing  2024 by Lizbeth Kumar RN  Outcome: Progressing     Problem: Postpartum  Goal: Experiences normal postpartum course  Description:  Postpartum OB-Pregnancy care plan goal which identifies if the mother is experiencing a normal postpartum course  2024 1157 by Tatyana Perdue RN  Outcome: Completed  2024 07 by Vishal Dodd RN  Outcome: Progressing  2024 by Lizbeth Kumar RN  Outcome: Progressing  Goal: Appropriate maternal -  bonding  Description:  Postpartum OB-Pregnancy care plan goal which identifies if the mother and  are bonding appropriately  2024 115 by Tatyana Perdue RN  Outcome: Completed  2024 07 by Vishal Dodd RN  Outcome: Progressing  2024 by Lizbeth Kumar RN  Outcome: Progressing  Goal: Establishment of infant feeding pattern  Description:  Postpartum OB-Pregnancy care plan goal which identifies if the mother is establishing a feeding pattern with their   2024 1157 by Tatyana Perdue RN  Outcome: Completed  2024 0747 by Vishal Dodd RN  Outcome: Progressing  2024 by Lizbeth Kumar RN  Outcome: Progressing  Goal: Incisions, wounds, or drain sites healing without S/S of infection  2024 115 by Tatyana Perdue RN  Outcome: Completed  2024 07 by Vishal Dodd RN  Outcome: Progressing  2024 by Lizbeth Kumar RN  Outcome: Progressing     Problem: Pain  Goal: Verbalizes/displays adequate comfort level or baseline comfort level  2024 115 by Iron

## 2024-05-18 NOTE — PLAN OF CARE
Problem: Vaginal Birth or  Section  Goal: Fetal and maternal status remain reassuring during the birth process  Description:  Birth OB-Pregnancy care plan goal which identifies if the fetal and maternal status remain reassuring during the birth process  2024 by Vishal Dodd RN  Outcome: Progressing  2024 by Lizbeth Kumar RN  Outcome: Progressing     Problem: Postpartum  Goal: Experiences normal postpartum course  Description:  Postpartum OB-Pregnancy care plan goal which identifies if the mother is experiencing a normal postpartum course  2024 by Vishal Dodd RN  Outcome: Progressing  2024 by Lizbeth Kumar RN  Outcome: Progressing  Goal: Appropriate maternal -  bonding  Description:  Postpartum OB-Pregnancy care plan goal which identifies if the mother and  are bonding appropriately  2024 by Vishal Dodd RN  Outcome: Progressing  2024 by Lizbeth Kumar RN  Outcome: Progressing  Goal: Establishment of infant feeding pattern  Description:  Postpartum OB-Pregnancy care plan goal which identifies if the mother is establishing a feeding pattern with their   2024 by Vishal Dodd RN  Outcome: Progressing  2024 by Lizbeth Kumar RN  Outcome: Progressing  Goal: Incisions, wounds, or drain sites healing without S/S of infection  2024 by Vishal Dodd RN  Outcome: Progressing  2024 by Lizbeth Kumar RN  Outcome: Progressing     Problem: Pain  Goal: Verbalizes/displays adequate comfort level or baseline comfort level  2024 by Vishal Dodd RN  Outcome: Progressing  2024 by Lizbeth Kumar RN  Outcome: Progressing     Problem: Infection - Adult  Goal: Absence of infection at discharge  2024 by Vishal Dodd RN  Outcome: Progressing  2024 by Lizbeth Kumar RN  Outcome: Progressing  Goal: Absence of infection during hospitalization  2024 by

## 2024-05-18 NOTE — FLOWSHEET NOTE
Pt up to restroom at this time, 100 mL of urine voided. Pt concerned about small clot in urine cristela RN educated pt about clot size and when to be concerned.

## 2024-06-11 ENCOUNTER — HOSPITAL ENCOUNTER (EMERGENCY)
Age: 24
Discharge: HOME | End: 2024-06-12
Attending: STUDENT IN AN ORGANIZED HEALTH CARE EDUCATION/TRAINING PROGRAM
Payer: COMMERCIAL

## 2024-06-11 VITALS
TEMPERATURE: 98.3 F | DIASTOLIC BLOOD PRESSURE: 80 MMHG | OXYGEN SATURATION: 98 % | RESPIRATION RATE: 17 BRPM | HEART RATE: 87 BPM | SYSTOLIC BLOOD PRESSURE: 147 MMHG

## 2024-06-11 DIAGNOSIS — S61.411A LACERATION OF RIGHT HAND WITHOUT FOREIGN BODY, INITIAL ENCOUNTER: Primary | ICD-10-CM

## 2024-06-11 PROCEDURE — 99282 EMERGENCY DEPT VISIT SF MDM: CPT

## 2024-06-11 PROCEDURE — 12001 RPR S/N/AX/GEN/TRNK 2.5CM/<: CPT

## 2024-06-11 PROCEDURE — 2500000003 HC RX 250 WO HCPCS: Performed by: STUDENT IN AN ORGANIZED HEALTH CARE EDUCATION/TRAINING PROGRAM

## 2024-06-11 RX ORDER — LIDOCAINE HYDROCHLORIDE 10 MG/ML
20 INJECTION, SOLUTION INFILTRATION; PERINEURAL ONCE
Status: COMPLETED | OUTPATIENT
Start: 2024-06-11 | End: 2024-06-11

## 2024-06-11 RX ADMIN — LIDOCAINE HYDROCHLORIDE 20 ML: 10 INJECTION, SOLUTION INFILTRATION; PERINEURAL at 22:44

## 2024-06-12 NOTE — DISCHARGE INSTRUCTIONS
Go to your primary care physician or return to the emergency department or urgent care in 10 days to have your sutures removed.    For pain use acetaminophen (Tylenol) or ibuprofen (Motrin / Advil), unless prescribed medications that have acetaminophen or ibuprofen (or similar medications) in it.  You can take over the counter acetaminophen tablets (1 - 2 tablets of the 500-mg strength every 6 hours) or ibuprofen tablets (2 tablets every 4 hours).    You can shower with the laceration, would avoid baths or swimming in lakes / rivers.  Apply bacitracin / triple antibiotic ointment / Neosporin / Vaseline to the wound twice a day.    When you go outside, place sunscreen on the healing wound after the sutures have been removed for the next year to help with scarring.    PLEASE RETURN TO THE EMERGENCY DEPARTMENT IMMEDIATELY for worsening symptoms, redness around the wound or redness streaking up the body part, white drainage from the wound, or if you develop any concerning symptoms such as: high fever not relieved by acetaminophen (Tylenol) and/or ibuprofen (Motrin / Advil), chills, shortness of breath, chest pain, feeling of your heart fluttering or racing, persistent nausea and/or vomiting, vomiting up blood, blood in your stool, numbness, loss of consciousness, weakness or tingling in the arms or legs or change in color of the extremities, changes in mental status, persistent headache, blurry vision, loss of bladder / bowel control, unable to follow up with your physician, or other any other care or concern.

## 2024-06-12 NOTE — ED PROVIDER NOTES
[facility] ED  Emergency Department Encounter  Emergency Medicine      Pt Name: Chuyita Reed  MRN:[id number]  Birthdate 2000  Date of evaluation: 24  PCP:  No, Pcp    CHIEF COMPLAINT       Chief Complaint   Patient presents with    Laceration     Right hand        HISTORY OF PRESENT ILLNESS  (Location/Symptom, Timing/Onset, Context/Setting, Quality, Duration, ModifyingFactors, Severity.)      Chuyita Reed is a 23 y.o. female laceration to the webbing of the right hand.  Cleaning a coffee pot did not realize it was broken and cut her webbing between her thumb and index finger, has no other complaints or concerns, has a bit of tingling to the tip of her thumb but has good sensation and able to fully move her thumb.  She is up-to-date on her vaccines, no other complaints or concerns  PAST MEDICAL / SURGICAL / SOCIAL /FAMILY HISTORY      has a past medical history of Hypertension and Mental disorder.  No other pertinent PMH on review with patient/guardian.     has a past surgical history that includes  section; pr fetal nonstress test (2024); and  section (N/A, 2024).  No other pertinent PSH on review with patient/guardian.  Social History     Socioeconomic History    Marital status: Single     Spouse name: Not on file    Number of children: Not on file    Years of education: Not on file    Highest education level: Not on file   Occupational History    Not on file   Tobacco Use    Smoking status: Never    Smokeless tobacco: Former   Vaping Use    Vaping Use: Never used   Substance and Sexual Activity    Alcohol use: Not Currently    Drug use: Never    Sexual activity: Not on file   Other Topics Concern    Not on file   Social History Narrative    Not on file     Social Determinants of Health     Financial Resource Strain: Not on file   Food Insecurity: No Food Insecurity (2024)    Hunger Vital Sign     Worried About Running Out of Food in the Last

## 2024-06-12 NOTE — ED TRIAGE NOTES
Pt arrived to triage via priavate vehicle.  Pt c/o hand laceration.  Pt states she was washing a glass cup and cut her hand.  Pt hand wrapped with gauze with bleeding controlled.

## (undated) DEVICE — DRESSING TELFA STRL 3X6

## (undated) DEVICE — STAPLER SKIN SQ 30 ABSRB STPL DISP INSORB ORDER VIA PHONE OR EMAIL

## (undated) DEVICE — SUTURE VICRYL SZ 1 L36IN ABSRB UD L36MM CT-1 1/2 CIR J947H

## (undated) DEVICE — Z DISCONTINUED USE 2753200 SUTURE VCRL SZ 3-0 L36IN ABSRB VLT CT L40MM 1/2 CIR J356H

## (undated) DEVICE — ASTOUND STANDARD SURGICAL GOWN, XL: Brand: CONVERTORS

## (undated) DEVICE — PREVENA INCISION MANAGEMENT SYSTEM- PEEL & PLACE DRESSING: Brand: PREVENA™ PEEL & PLACE™

## (undated) DEVICE — STERILE NEOPRENE POWDER-FREE SURGICAL GLOVES WITH NITRILE COATING: Brand: PROTEXIS

## (undated) DEVICE — SUTURE VICRYL SZ 4-0 L27IN ABSRB UD L60MM KS STR REV CUT NDL J662H

## (undated) DEVICE — SUTURE VICRYL SZ 0 L36IN ABSRB VLT L36MM CT-1 1/2 CIR J346H

## (undated) DEVICE — SYRINGE BULB EAR ULCER LF ST 1 OZ MEDICHOICE